# Patient Record
Sex: FEMALE | Race: WHITE | URBAN - METROPOLITAN AREA
[De-identification: names, ages, dates, MRNs, and addresses within clinical notes are randomized per-mention and may not be internally consistent; named-entity substitution may affect disease eponyms.]

---

## 2023-10-22 PROBLEM — R42 DIZZINESS: Status: ACTIVE | Noted: 2023-10-22

## 2023-10-22 PROBLEM — H90.3 ASNHL (ASYMMETRICAL SENSORINEURAL HEARING LOSS): Status: ACTIVE | Noted: 2023-10-22

## 2023-10-24 ENCOUNTER — APPOINTMENT (OUTPATIENT)
Dept: OTOLARYNGOLOGY | Facility: CLINIC | Age: 58
End: 2023-10-24
Payer: COMMERCIAL

## 2023-10-31 ENCOUNTER — APPOINTMENT (OUTPATIENT)
Dept: OTOLARYNGOLOGY | Facility: CLINIC | Age: 58
End: 2023-10-31
Payer: COMMERCIAL

## 2023-11-21 ENCOUNTER — CLINICAL SUPPORT (OUTPATIENT)
Dept: AUDIOLOGY | Facility: CLINIC | Age: 58
End: 2023-11-21
Payer: COMMERCIAL

## 2023-11-21 DIAGNOSIS — H81.11 BPPV (BENIGN PAROXYSMAL POSITIONAL VERTIGO), RIGHT: ICD-10-CM

## 2023-11-21 DIAGNOSIS — R42 DIZZINESS: Primary | ICD-10-CM

## 2023-11-21 DIAGNOSIS — H90.3 ASNHL (ASYMMETRICAL SENSORINEURAL HEARING LOSS): ICD-10-CM

## 2023-11-21 PROCEDURE — 92537 CALORIC VSTBLR TEST W/REC: CPT

## 2023-11-21 PROCEDURE — 92700 UNLISTED ORL SERVICE/PX: CPT

## 2023-11-21 PROCEDURE — 92540 BASIC VESTIBULAR EVALUATION: CPT

## 2023-11-21 NOTE — PROGRESS NOTES
BALANCE FUNCTION TEST (BFT)    Name:  Sue Holland  :  1965  Age:  58 y.o.  Date of Evaluation:  2023    Time: 10:30-12:00    IMPRESSIONS     Positive evidence for active Benign Paroxysmal Positional Vertigo (BPPV) given the following: Up-beating torsional nystagmus to the right in the head right position with reversal, indicative of right posterior canal BPPV. Canalith repositioning treatment (i.e. Nehemias-Hallpike Maneuver) was performed prior to leaving today's appointment. Patient tolerated treatment well.    Suspected additional peripheral vestibular involvement given the following: Borderline asymmetric caloric irrigations (weaker to the right), evidence of low total SPV for right ear caloric testing, and evidence of overt saccades for the right lateral and posterior canals during vHIT testing. Note that current clinical balance tests cannot distinguish between lesions of the labyrinth, VIIIth nerve, or root entry zone of the brainstem vestibular nuclei, thus the phrase peripheral refers to all of the structures. The vestibular system appears to be compensated physiologically and uncompensated functionally.    There were no indications of central vestibular system pathway involvement.     RECOMMENDATIONS     Consider vestibular physical therapy to address active right posterior canal BPPV and uncompensated unilateral vestibulopathy. Emphasis on gaze stabilization exercises for VOR gain, habituation exercises to triggers, and fall risk prevention.  Consider re-evaluation as medically indicated.  Maintain a healthy lifestyle to help body function overall.  Continue monitoring per ENT/PCP preference.    HISTORY     Patient was seen for Balance Function Testing (BFT) due to a history of dizziness/imbalance. Vestibular case history collected via patient-clinician interview, patient chart review, and patient questionnaires.    Patient reports bilateral sudden sensorineural hearing loss (SSNHL) about 20  years ago with hearing reportedly recovering following steroid injections. She had mild clumsiness at that time and underwent a VNG/ENG examination for suspected Meniere's disease (results not available for review today). A second occurrence of bilateral SSNHL happened about 2 years later in which her left-sided hearing did not recover. She has associated left-sided tinnitus described as debilitating. In April of this year, she developed right-sided SSNHL. She was treated with oral steroids and intratympanic steroid injections in both ears with significant hearing improvement, though she now has some nonpulsatile tinnitus in her right ear.     Patient also reports history of intermittent imbalance beginning several years ago, as well as episodic dizziness described as room spinning (which lasts about 10-15 seconds). Symptoms are provoked by lying down. Episodes occur nightly and last several seconds before subsiding. Patient reports she occasionally has more severe episodes that last 30 minutes to 24 hours, but she has not had a severe episodes in several years. Most recent episode occurred last night. She has been treated for Benign Paroxysmal Positional Vertigo (BPPV) in the past.    This patient has had a total of 5 falls due to their symptoms, she notes usually while on vacation. No major injury as a result of falls. Denied any recent medication changes. Patient reported complying with pre-test instructions. No significant neck pain or restriction which would contraindicate portions of testing today. Most recent audiologic evaluation performed on 9/25/2023 revealed normal hearing sloping to a mild hearing loss in the right ear and a severe sensorineural hearing loss in the left ear. Tympanometry revealed normal eardrum mobility and canal volume, bilaterally.    EVALUATION     See VNG & vHIT Raw Data    TEST RESULTS     BEDSIDE ASSESSMENT TEST  The bedside assessment is an optional portion of the test battery to  further assist in differential diagnosis and screen for eye abnormalities which may affect testing.    Otoscopy: unremarkable.  Extra-Ocular Range of Motion: normal. Of note, leftward and rightward endpoint nystagmus present. Extra-Ocular Range of Motion is performed to evaluate any eye abnormalities prior to testing.  Cover/Uncover: normal. Cover/Uncover test is performed to evaluate for skewed deviation of the eyes prior to testing.  Cervical Neck Exam: normal. Cervical Neck is performed to evaluate any restrictions or pain with neck movement prior to testing.  Vertebral Artery Screen: normal. Vertebral Artery Screen is performed to evaluate for vertebrobasilar insufficiency prior to testing.   Ocular Counter-Roll: normal. Ocular Counter-Roll is performed to evaluate ocular tilt reaction and assess otolith organ function prior to testing.  VOR Cancellation: normal. VOR Cancellation is performed to evaluate fixation suppression prior to testing.      VIDEONYSTAGMOGRAPHY (VNG) TEST  VNG provides objective indications of peripheral and central vestibulo-ocular pathway involvement. Ocular motor testing to visually guided targets is conducted using a dual channel video-recording technique for the recording of eye movement in the horizontal and vertical planes. Air caloric testing is performed at 48 degrees C and 24 degrees C.    Spontaneous Nystagmus test was absent. Spontaneous nystagmus testing may help with the identification of an acute or uncompensated peripheral vestibular lesion.   Gaze Nystagmus test was normal. Gaze nystagmus testing is to evaluate for nystagmus that is evoked by holding eye gaze in any particular direction. True gaze nystagmus is amplified when vision is denied.   Random Saccades test was normal. Random saccade testing is to evaluate patient's ability to make fast random eye movements along a horizontal moving target.   Smooth Pursuit/Tracking test was normal. Smooth pursuit/tracking testing  is to evaluate the ability to move eyes with a single smoothly moving target.   Optokinetic nystagmus testing was normal at 40 d/s. Of note, abnormally low gain observed in the right eye only for leftward eye movements, however, confirmed conjugate eye movement during bedside examination. Not indicative of true abnormal response therefore not clinically significant. This full-field OPK test is to evaluate the ability of central nervous system to stabilize vision during sustained head movement after the VOR system loses effectiveness.   Nehemias-Hallpike testing was abnormal given the presence of up-beating torsional nystagmus to the right in the head right position. Patient reported dizziness during testing and nystagmus reduced with fixation. Reversal of nystagmus upon sitting was observed. Results are indicative of right posterior canal BPPV. [place]-Hallpike testing is to provide a diagnosis of Benign Paroxysmal Positional Vertigo (BPPV) of the vertical semicircular canals on the side which is most affected.  Roll testing was normal. Roll testing is to provide a diagnosis of Benign Paroxysmal Positional Vertigo (BPPV) of the horizontal semicircular canals on the side which is most affected.  Positional testing was normal. Positional testing is to evaluate patient's ability to hold a steady gaze while in different positions.  Bithermal caloric testing was borderline abnormal. Unilateral weakness of 23% to the right which is borderline abnormal and a directional preponderance of 8% to the left which is normal. Caloric testing is to evaluate for peripheral vestibular lesion.      VIDEO HEAD IMPULSE TEST (vHIT)  The vHIT procedure provides objective assessment of the high frequency vestibulo-ocular reflex (VOR) for each semicircular canal. Rapid, random horizontal and vertical thrusts are applied to the patient's head to provoke the VOR.    Right Lateral gain was normal. There was evidence of overt saccades.   Left Lateral  gain was normal. There was evidence of overt saccades.     Right Anterior gain was normal. There was no evidence of overt or covert saccades.  Left Posterior gain was normal. There was no evidence of overt or covert saccades.    Left Anterior gain was normal. There was no evidence of overt or covert saccades.  Right Posterior gain was normal. There was evidence of overt saccades.       Testing and interpretation of results completed by NAOMIE Souza, CCC-A. It was my pleasure to evaluate this patient.     Additional Attendees: Joann De Jesus, Doctor of Audiology Extern       Naomie Souza CCC-A [facility]  Licensed Audiologist

## 2024-01-30 ENCOUNTER — TELEMEDICINE (OUTPATIENT)
Dept: OTOLARYNGOLOGY | Facility: CLINIC | Age: 59
End: 2024-01-30
Payer: COMMERCIAL

## 2024-01-30 DIAGNOSIS — H90.3 BILATERAL SENSORINEURAL HEARING LOSS: ICD-10-CM

## 2024-01-30 DIAGNOSIS — Z01.818 PRE-OP TESTING: ICD-10-CM

## 2024-01-30 DIAGNOSIS — H90.3 ASNHL (ASYMMETRICAL SENSORINEURAL HEARING LOSS): ICD-10-CM

## 2024-01-30 PROCEDURE — 99212 OFFICE O/P EST SF 10 MIN: CPT | Performed by: STUDENT IN AN ORGANIZED HEALTH CARE EDUCATION/TRAINING PROGRAM

## 2024-01-31 ENCOUNTER — TELEPHONE (OUTPATIENT)
Dept: OTOLARYNGOLOGY | Facility: CLINIC | Age: 59
End: 2024-01-31
Payer: COMMERCIAL

## 2024-01-31 DIAGNOSIS — Z01.818 PRE-OP TESTING: ICD-10-CM

## 2024-01-31 DIAGNOSIS — H90.3 ASNHL (ASYMMETRICAL SENSORINEURAL HEARING LOSS): ICD-10-CM

## 2024-01-31 RX ORDER — PNEUMOCOCCAL 20-VALENT CONJUGATE VACCINE 2.2; 2.2; 2.2; 2.2; 2.2; 2.2; 2.2; 2.2; 2.2; 2.2; 2.2; 2.2; 2.2; 2.2; 2.2; 2.2; 4.4; 2.2; 2.2; 2.2 UG/.5ML; UG/.5ML; UG/.5ML; UG/.5ML; UG/.5ML; UG/.5ML; UG/.5ML; UG/.5ML; UG/.5ML; UG/.5ML; UG/.5ML; UG/.5ML; UG/.5ML; UG/.5ML; UG/.5ML; UG/.5ML; UG/.5ML; UG/.5ML; UG/.5ML; UG/.5ML
0.5 INJECTION, SUSPENSION INTRAMUSCULAR ONCE
Qty: 0.5 ML | Refills: 0 | Status: SHIPPED | OUTPATIENT
Start: 2024-01-31 | End: 2024-01-31

## 2024-01-31 NOTE — TELEPHONE ENCOUNTER
Contacted patient at [phone] to discuss surgery dates which include, [place]- 2/7, 3/6, 3/20 & 3/22 at [facility]. Left office contact information for the patient to confirm or deny whether any of those dates work for her.

## 2024-01-31 NOTE — PROGRESS NOTES
This visit was performed over the phone and for this reason, physical exam was deferred.    Evaluation for cochlear implant      History of present illness from 9/26/2023:  Ms. Holland is a 58-year-old woman who presents with profound left-sided hearing loss. She reports that she had sudden loss of hearing 20 years ago, in both of her ears. She was treated with steroids at the time, which resulted in recovery of her hearing. However, over the course of 2 years, she had gradual loss of hearing in her left ear, and for the past 18 years has had no serviceable hearing in left ear. She had been stable in her right ear over that entire course of time. She does report that she intermittently will have some dizziness, which sometimes is consistent with benign paroxysmal positional vertigo and happens when she is lying down and turning to the side. These episodes of vertigo usually lasts a few seconds. She is familiar with BPPV and is not concerned with this. She does occasionally have more severe episodes that last 30 minutes to 24 hours, but she has not had 1 of those episodes in many years. She does occasionally feel off balance generally. This typically accompanies having tinnitus and fullness in 1 or both of her ears. She reports that the tinnitus in her left ear is very bothersome. Additionally, in April of this year, she developed sudden hearing loss in the right ear. She was treated with oral steroids and intratympanic steroid injections in both ears. She reports that she had significant improvement in the right ear, though she now has some nonpulsatile tinnitus in her right ear. She has been stable off steroids in the right ear.     Interval History: She underwent VNG, which was concerning for right BPPV, and had an epley maneuver.  She has been doing better from a balance perspective.  She is interested in cochlear implant on the left.    The patient's current medications, active allergies and list of medical  problems were reviewed in the EHR and confirmed electronically.    Diagnostic testing:  I reviewed the audiogram from 9/25/2023, which demonstrates mild high-frequency sensorineural hearing loss only at 8000 Hz. It also demonstrates profound left sensorineural hearing loss. There are type a tympanograms bilaterally. Word recognition score is 96% on the right and 0% on the left.     I reviewed the results of the MRI IAC, both the images and the report. There was no evidence of retrocochlear pathology.     I also reviewed the results of the cochlear implant evaluation below:  R =   SRT = 5 dB HL  CNC words in quiet at 50 dB %  AzBio in noise at 50 dB HL with 40 dB noise = 97%  Pure tone responses from 250 Hz - 6000 Hz were in the normal hearing range.      L = Left Phonak P UP BTE hearing aid (opposite ear plugged and masked)  SRT = 65 dB HL  CNC words in quiet at 50 dB HL = 0%  AzBio in noise at 50 dB HL with 40 dB noise = 0%     I reviewed the VNG, which demonstrated right BPPV and possibly a slight weakness on the right, but mostly symmetric balance function.    Impression:  Left profound sensorineural hearing loss  Intermittent dizziness and loss of hearing     Recommendation:  I discussed options for her hearing rehabilitation on the left side. I discussed CROS hearing aids, bone-conduction devices, and cochlear implantation. I discussed that cochlear implantation may be beneficial in terms of localization, hearing and noise, and may reduce some of the tinnitus in her left ear. I discussed the risks of cochlear implantation including, but not limited to, poor performance, bleeding, pain, infection, loss of natural hearing, change in taste, facial weakness, dizziness, cerebrospinal fluid leak, tympanic membrane perforation, implant infection, implant extrusion, implant malfunction, meningitis. I also discussed the importance of Prevnar vaccination. She would like to pursue left cochlear implantation. I would  recommend cochlear brand 632 with 622 backup or advanced bionic slim J with a slim J backup or MED-EL flex soft with flex 28 backup.  We will plan for left cochlear implantation.    Jas Wetzel MD

## 2024-02-01 ENCOUNTER — TELEPHONE (OUTPATIENT)
Dept: OTOLARYNGOLOGY | Facility: CLINIC | Age: 59
End: 2024-02-01
Payer: COMMERCIAL

## 2024-02-01 NOTE — TELEPHONE ENCOUNTER
Left voice message at [phone] to discuss travel plans and recovery time needed before flying. Per Dr. Wetzel, the patient must wait 2 weeks post surgery before flying. Will await callback to discuss whether the recovery time needed prior to flying causes a conflict with travel plans.

## 2024-02-02 ENCOUNTER — TELEPHONE (OUTPATIENT)
Dept: OTOLARYNGOLOGY | Facility: CLINIC | Age: 59
End: 2024-02-02
Payer: COMMERCIAL

## 2024-02-02 NOTE — TELEPHONE ENCOUNTER
Spoke with patient to confirm 4/3 cochlear implant surgery. The patient has an appointment with Audiology to select a device and plans to call the CI coordinator to set up all of post op appointments.

## 2024-02-06 ENCOUNTER — TELEPHONE (OUTPATIENT)
Dept: OTOLARYNGOLOGY | Facility: CLINIC | Age: 59
End: 2024-02-06
Payer: COMMERCIAL

## 2024-02-06 NOTE — TELEPHONE ENCOUNTER
Spoke with patient regarding request to change surgery date from 4/3 to 4/17 due to travel plans. Patient agreeable to 4/17 surgery date at [facility].

## 2024-02-13 ENCOUNTER — CLINICAL SUPPORT (OUTPATIENT)
Dept: AUDIOLOGY | Facility: CLINIC | Age: 59
End: 2024-02-13
Payer: COMMERCIAL

## 2024-02-13 DIAGNOSIS — H90.3 ASNHL (ASYMMETRICAL SENSORINEURAL HEARING LOSS): ICD-10-CM

## 2024-02-13 PROCEDURE — 92626 EVAL AUD FUNCJ 1ST HOUR: CPT

## 2024-02-14 NOTE — PROGRESS NOTES
COCHLEAR IMPLANT EVALUATION: ADULT (candidate)    Name:  Sue Holland  :  1965  Age:  58 y.o.  Date of Evaluation:  2024    HISTORY  Sue Holland, age 58 years, arrives on 2024 for the device selection of a left cochlear implant after an evaluation on 2023 determined her to be a candidate    RECALL  She reports her first bilateral sudden sensorineural hearing loss (SSNHL) occurred about 20 years ago. It was treated with steroid injections that improved her hearing. About 2 years later she reports a second SSNHL that was treated; her left hearing never recovered. She reports since that time she experiences bilateral ear fullness/pressure, fluctuating hearing loss, and vertigo. She has left debilitating tinnitus with a recent start of right tinnitus. She has never worn hearing aids and reports at the first episode of SSNHL a VNG/ENG was completed for Meniere's disease.     AUDIOLOGIC EVALUATION COMPLETED ON 2023:    OTOSCOPY  Otoscopic inspection revealed clear canals and visualization of the eardrum bilaterally.     IMMITTANCE   Normal tympanograms were obtained bilaterally, consistent with a normal moving eardrums and the likely absence of fluid.     Ipsilateral acoustic reflexes were tested and present at 500Hz, 1000Hz, 2000Hz, and 4000Hz in the right ear and absent in the left ear.     AUDIOMETRIC TESTING  Pure tone audiometry conducted via insert headphones from 250 Hz - 8000 Hz was consistent with:  Right ear: normal hearing sloping to a mild hearing loss at 8000 Hz  Left: severe sensorineural hearing loss.      SPEECH TESTING  Speech recognition was in agreement with pure tone averages bilaterally ( Right: 10 dB HL, Left: 90 dB HL)     Word understanding was excellent in the right ear (100%) and poor(0%) in the left ear using recorded ordered by difficulty NU6 word list      COCHLEAR IMPLANT EVALUATION FROM 2023  Otoscopy indicated clear ear canals and visible tympanic  membranes, bilaterally.     All testing was completed in the soundfield at 0 degrees azimuth. Pure tone testing was obtained using frequency modulating (FM) stimuli, and SRT was obtained using monitored live voice (MLV). Word and sentence recognition testing was performed with recorded material at 50 dB HL. 50 dB HL = 60 dB A     R = Unaided  SRT = 5 dB HL  CNC words in quiet at 50 dB %  AzBio in noise at 50 dB HL with 40 dB noise = 97%  Pure tone responses from 250 Hz - 6000 Hz were in the normal hearing range.      L = Left Phonak P UP BTE hearing aid (opposite ear plugged and masked)  SRT = 65 dB HL  CNC words in quiet at 50 dB HL = 0%  AzBio in noise at 50 dB HL with 40 dB noise = 0%  Pure tone responses from 250 Hz - 6000 Hz were in the moderate to severe hearing loss range.     QUALITY OF LIFE (QOL) SURVEY: 2/13/2024    QOL 35:                    Raw Score                         Converted Score  Communication               26                                         41.07  Emotional                        17                                          59.22  Entertainment                 23                                          79.42  Environment                   19                                           61.22  Listening Effort                 17                                          51.83       Social                               23                                           82.1  Global                              34                                          51.99     ORDER  Brown [place] 2  Extra home   Portable    Mini Nba    RESULTS  Due to debilitating tinnitus, the patient has expressed interest in a cochlear implant. An evaluation of the right ear completed on 9/25/2023 revealed 97% performance on an open-set sentence recognition test. An evaluation of the left ear with appropriately fit amplification revealed 0% performance on an open-set sentence recognition test. The  audiologic findings demonstrate that Sue has partial residual hearing for tonal stimuli and speech detection with hearing aid, demonstrating that the auditory cranial nerve can be stimulated. Testing shows poor sentence and word understanding, even with appropriately programmed hearing aid indicating a severe to profound functional impairment in the left ear.      On this basis, unaided and aided testing indicates Sue is an audiologic candidate for a cochlear implant in the left ear.      Today, we completed a device selection to include a brown Kanso 2, extra home , portable  and mini yuriy     Time spent with patient: 1 hour    Completed by:  Naomie Feng, CCC-A, [facility]  Licensed Audiologist

## 2024-03-25 ASSESSMENT — ENCOUNTER SYMPTOMS
WHEEZING: 1
COUGH: 1

## 2024-03-25 NOTE — CPM/PAT H&P
CPM/PAT Evaluation       Name: Sue Holland (Sue Holland)  /Age: 1965/58 y.o.     TELEMEDICINE ENCOUNTER  Patient was contacted by telephone for preadmission testing perioperative risk assessment prior to surgery.    CHIEF COMPLAINT  Bilateral sensorineural hearing loss    HPI  Sue Holland is a 58-year-old female with bilateral sensorineural hearing loss.  She states that 20 years ago she experienced sudden hearing loss in both ears, and was treated with steroids, and recovered her hearing.  Over the past 2 years she has had gradual hearing loss in her left ear, and gradual right hearing loss since 2023..  She also has tinnitus in her left ear.  With the sudden right hearing loss 1 year ago she was treated with oral steroids and intratympanic steroid injections bilaterally.  She reports significant improvement in the right ear since that time.  She is interested in having a cochlear implant for her continued left hearing loss, and has been found to be a candidate for this surgery.  She is scheduled for insertion of left cochlear implant on 2024.      ACTIVE PROBLEMS  Patient Active Problem List   Diagnosis    ASNHL (asymmetrical sensorineural hearing loss)    Dizziness     PAST MEDICAL HISTORY  Past Medical History:   Diagnosis Date    PONV (postoperative nausea and vomiting)      SURGICAL HISTORY  Past Surgical History:   Procedure Laterality Date     SECTION, LOW TRANSVERSE      HYSTERECTOMY      Hysterectomy BSO     ANESTHESIA HISTORY  Reports history of PONV.  Denies other problems with anesthesia in the past such as prolonged sedation, awareness, dental damage, aspiration, cardiac arrest, difficult intubation, or unexpected hospital admissions. Denies family history of malignant hyperthermia, or pseudocholinesterase deficiency.      SOCIAL HISTORY  Never smoker; denies alcohol, medical marijuana, recreational drug use.  Patient states in good weather she will go for walks  several times a week for exercise.  She states she is able to do moderate ADLs such as heavy housework, light yard work.  She denies chest pain, RUSH.  METS 4    FAMILY HISTORY  No family history on file.    ALLERGIES  No Known Allergies    MEDICATIONS  No current facility-administered medications for this encounter.  No current outpatient medications on file.    Review of Systems   HENT:  Positive for congestion and hearing loss.    Respiratory:  Positive for cough and wheezing.         Patient reports upper respiratory tract infection for the past week.  She was prescribed a 10-day course of amoxicillin on 03/21/2024.  Patient denies any fevers, chills.  Reports improvement with wheezing and cough.  Occasional wheezing that is able to be cleared with coughing.   All other systems reviewed and are negative.    PHYSICAL EXAM  Deferred    AIRWAY EXAM  Deferred    VITALS  No vitals taken for telemedicine visit  Height: 4 feet 11 inches; weight: 145 pounds; BMI: 29.29    LABS/ IMAGING  Lab orders for CBC, BMP; EKG placed by Dr. Wetzel.      ASSESSMENT/PLAN  Bilateral sensorineural hearing loss  Insertion of left cochlear implant      This note was created in part upon personal review of patient's medical records.  Speech recognition transcription software was used in the creation of this note. Despite proofreading, several typographical errors might be present that might affect the meaning of the content.

## 2024-03-25 NOTE — PREPROCEDURE INSTRUCTIONS
Pre-Op Instructions & Checklist  Your surgery has been scheduled at [facility] at [address] [place], [address], [address], in the [facility]. Parking is to the left of the main entrance.  You will be contacted about the time of your surgery the day before your surgery. If you are unable to answer the phone, a detailed voicemail message will be left. Make sure that your voicemail box is not full so a message can be left. If you have not received a call by 3:00 pm you may call [phone] between the hours of 3:00 and 4:00 pm. Please be available by phone the night before/day of surgery in case there is a change in the schedule which may require you to arrive earlier/later.  14 DAYS BEFORE SURGERY STOP TAKING WEIGHT LOSS MEDICATIONS     7 DAYS BEFORE SURGERY STOP THESE MEDICATIONS:  Multiple Vitamins containing Vitamin E  Herbal supplements, Fish Oil, garlic pills, turmeric, CoQ enzyme  Stop taking aspirin, and aspirin-containing products as well as NSAID's such as Advil, Motrin, Aleve, Ibuprofen. Tylenol is okay to take for pain relief.   If you are currently taking Coumadin/Warfarin, we will have to coordinate that with your PCP &/or the Anticoagulation Clinic.    THE DAY BEFORE SURGERY:  *Do not eat any food after midnight the night before surgery.   *You are permitted to have clear liquids such as water, apple juice, plain tea or coffee (no milk or creamer), clear electrolyte-replenishing drinks such as Pedialyte, Gatorade, or Powerade (not yogurt or pulp-containing smoothies or juices such as orange juice) up to 2 hours before your surgery.  DAY OF SURGERY, TAKE THESE MEDICATIONS with a small sip of water (if it is not listed, do not take it):    There are no medications for you to take the morning of surgery.      ON THE MORNING OF SURGERY:  *Shower either the night before your surgery or the morning of your surgery  *Do not use moisturizers, creams, lotions or perfume, or  make-up.  *Wear comfortable, loose fitting clothing.   *All jewelry and valuables should be left at home.  *Prosthetic devices such as contact lenses, hearing aids, dentures, eyelash extensions, hairpins and body piercing must be removed before surgery. Bring containers for eyeglasses/contacts, dentures, or hearing aids with you.  Diabetics: Please check fasting blood sugars upon waking up.  If fasting blood sugars are<80ml/dl, please drink 3 ounces of apple juice no later than 2 hours prior to surgery.    BRING WITH YOU:  *Photo ID and insurance card  *Current list of medicines and allergies  *Pacemaker/Defibrillator/Heart stent cards  *Copy of your complete Advanced Directive/DHPOA-if applicable    SMOKING:  *Quitting smoking can make a huge difference to your health and recovery from surgery.    *If you need help with quitting, call [phone]-NOW.  Alcohol:  *No alcoholic beverages for 48 hours before surgery.    AFTER OUTPATIENT SURGERY:  *A responsible adult MUST accompany you at the time of discharge and stay with you for 24 hours after your surgery.  *You may NOT drive yourself home after surgery.  *You may use a taxi or ride sharing service (Lyft, Uber) to return home ONLY if you are accompanied by a friend or family member.  *Instructions for resuming your medications will be provided by your surgeon.    CONTACT SURGEON'S OFFICE IF YOU DEVELOP:  * Fever =/> 100.4 F   * New respiratory symptoms (e.g. cough, shortness of breath, respiratory distress, sore throat)  * Recent loss of taste or smell  *Flu like symptoms such as headache, fatigue or gastrointestinal symptoms  * If you develop any open sores, shingles, burning or painful urination   AND/OR:  * You no longer wish to have the surgery.  * Any other personal circumstances change that may lead to the need to cancel or defer this surgery.  *You were admitted to any hospital within one week of your planned procedure.      If you have any questions  regarding these preoperative instructions you may call [phone]. If you have questions regarding you surgical procedure, or post-operative care/recovery please call your surgeon's office.

## 2024-04-04 ENCOUNTER — HOSPITAL ENCOUNTER (OUTPATIENT)
Dept: CARDIOLOGY | Facility: HOSPITAL | Age: 59
Discharge: HOME | End: 2024-04-04
Payer: COMMERCIAL

## 2024-04-04 ENCOUNTER — LAB (OUTPATIENT)
Dept: LAB | Facility: LAB | Age: 59
End: 2024-04-04
Payer: COMMERCIAL

## 2024-04-04 DIAGNOSIS — Z01.818 PRE-OP TESTING: ICD-10-CM

## 2024-04-04 LAB
ALBUMIN SERPL BCP-MCNC: 4 G/DL (ref 3.4–5)
ALP SERPL-CCNC: 57 U/L (ref 33–136)
ALT SERPL W P-5'-P-CCNC: 9 U/L (ref 7–45)
ANION GAP SERPL CALC-SCNC: 10 MMOL/L (ref 10–20)
AST SERPL W P-5'-P-CCNC: 13 U/L (ref 9–39)
BILIRUB SERPL-MCNC: 0.4 MG/DL (ref 0–1.2)
BUN SERPL-MCNC: 11 MG/DL (ref 6–23)
CALCIUM SERPL-MCNC: 9.1 MG/DL (ref 8.6–10.3)
CHLORIDE SERPL-SCNC: 106 MMOL/L (ref 98–107)
CO2 SERPL-SCNC: 29 MMOL/L (ref 21–32)
CREAT SERPL-MCNC: 0.66 MG/DL (ref 0.5–1.05)
EGFRCR SERPLBLD CKD-EPI 2021: >90 ML/MIN/1.73M*2
ERYTHROCYTE [DISTWIDTH] IN BLOOD BY AUTOMATED COUNT: 14 % (ref 11.5–14.5)
GLUCOSE SERPL-MCNC: 60 MG/DL (ref 74–99)
HCT VFR BLD AUTO: 44 % (ref 36–46)
HGB BLD-MCNC: 13.9 G/DL (ref 12–16)
MCH RBC QN AUTO: 27.3 PG (ref 26–34)
MCHC RBC AUTO-ENTMCNC: 31.6 G/DL (ref 32–36)
MCV RBC AUTO: 86 FL (ref 80–100)
NRBC BLD-RTO: 0 /100 WBCS (ref 0–0)
PLATELET # BLD AUTO: 488 X10*3/UL (ref 150–450)
POTASSIUM SERPL-SCNC: 4.6 MMOL/L (ref 3.5–5.3)
PROT SERPL-MCNC: 6.6 G/DL (ref 6.4–8.2)
RBC # BLD AUTO: 5.1 X10*6/UL (ref 4–5.2)
SODIUM SERPL-SCNC: 140 MMOL/L (ref 136–145)
WBC # BLD AUTO: 9.1 X10*3/UL (ref 4.4–11.3)

## 2024-04-04 PROCEDURE — 85027 COMPLETE CBC AUTOMATED: CPT

## 2024-04-04 PROCEDURE — 36415 COLL VENOUS BLD VENIPUNCTURE: CPT

## 2024-04-04 PROCEDURE — 80053 COMPREHEN METABOLIC PANEL: CPT

## 2024-04-04 PROCEDURE — 93005 ELECTROCARDIOGRAM TRACING: CPT

## 2024-04-04 PROCEDURE — 93010 ELECTROCARDIOGRAM REPORT: CPT | Performed by: INTERNAL MEDICINE

## 2024-04-16 ENCOUNTER — ANESTHESIA EVENT (OUTPATIENT)
Dept: OPERATING ROOM | Facility: CLINIC | Age: 59
End: 2024-04-16
Payer: COMMERCIAL

## 2024-04-17 ENCOUNTER — HOSPITAL ENCOUNTER (OUTPATIENT)
Facility: CLINIC | Age: 59
Setting detail: OUTPATIENT SURGERY
Discharge: HOME | End: 2024-04-17
Attending: STUDENT IN AN ORGANIZED HEALTH CARE EDUCATION/TRAINING PROGRAM | Admitting: STUDENT IN AN ORGANIZED HEALTH CARE EDUCATION/TRAINING PROGRAM
Payer: COMMERCIAL

## 2024-04-17 ENCOUNTER — ANESTHESIA (OUTPATIENT)
Dept: OPERATING ROOM | Facility: CLINIC | Age: 59
End: 2024-04-17
Payer: COMMERCIAL

## 2024-04-17 VITALS
SYSTOLIC BLOOD PRESSURE: 128 MMHG | HEIGHT: 59 IN | WEIGHT: 158.73 LBS | RESPIRATION RATE: 18 BRPM | BODY MASS INDEX: 32 KG/M2 | OXYGEN SATURATION: 95 % | DIASTOLIC BLOOD PRESSURE: 77 MMHG | HEART RATE: 90 BPM | TEMPERATURE: 97.7 F

## 2024-04-17 DIAGNOSIS — H90.3 ASNHL (ASYMMETRICAL SENSORINEURAL HEARING LOSS): Primary | ICD-10-CM

## 2024-04-17 DIAGNOSIS — G89.18 ACUTE POSTOPERATIVE PAIN: ICD-10-CM

## 2024-04-17 PROCEDURE — 2500000001 HC RX 250 WO HCPCS SELF ADMINISTERED DRUGS (ALT 637 FOR MEDICARE OP): Performed by: ANESTHESIOLOGY

## 2024-04-17 PROCEDURE — A4217 STERILE WATER/SALINE, 500 ML: HCPCS | Performed by: STUDENT IN AN ORGANIZED HEALTH CARE EDUCATION/TRAINING PROGRAM

## 2024-04-17 PROCEDURE — 2500000004 HC RX 250 GENERAL PHARMACY W/ HCPCS (ALT 636 FOR OP/ED): Performed by: ANESTHESIOLOGY

## 2024-04-17 PROCEDURE — 7100000009 HC PHASE TWO TIME - INITIAL BASE CHARGE: Performed by: STUDENT IN AN ORGANIZED HEALTH CARE EDUCATION/TRAINING PROGRAM

## 2024-04-17 PROCEDURE — 7100000010 HC PHASE TWO TIME - EACH INCREMENTAL 1 MINUTE: Performed by: STUDENT IN AN ORGANIZED HEALTH CARE EDUCATION/TRAINING PROGRAM

## 2024-04-17 PROCEDURE — L8614 COCHLEAR DEVICE: HCPCS | Performed by: STUDENT IN AN ORGANIZED HEALTH CARE EDUCATION/TRAINING PROGRAM

## 2024-04-17 PROCEDURE — 2500000004 HC RX 250 GENERAL PHARMACY W/ HCPCS (ALT 636 FOR OP/ED): Performed by: STUDENT IN AN ORGANIZED HEALTH CARE EDUCATION/TRAINING PROGRAM

## 2024-04-17 PROCEDURE — 2500000005 HC RX 250 GENERAL PHARMACY W/O HCPCS: Performed by: NURSE ANESTHETIST, CERTIFIED REGISTERED

## 2024-04-17 PROCEDURE — 3700000002 HC GENERAL ANESTHESIA TIME - EACH INCREMENTAL 1 MINUTE: Performed by: STUDENT IN AN ORGANIZED HEALTH CARE EDUCATION/TRAINING PROGRAM

## 2024-04-17 PROCEDURE — 2500000004 HC RX 250 GENERAL PHARMACY W/ HCPCS (ALT 636 FOR OP/ED): Performed by: NURSE ANESTHETIST, CERTIFIED REGISTERED

## 2024-04-17 PROCEDURE — A69930 PR IMPLANT COCHLEAR DEVICE: Performed by: ANESTHESIOLOGY

## 2024-04-17 PROCEDURE — A69930 PR IMPLANT COCHLEAR DEVICE: Performed by: NURSE ANESTHETIST, CERTIFIED REGISTERED

## 2024-04-17 PROCEDURE — 2500000002 HC RX 250 W HCPCS SELF ADMINISTERED DRUGS (ALT 637 FOR MEDICARE OP, ALT 636 FOR OP/ED): Performed by: ANESTHESIOLOGY

## 2024-04-17 PROCEDURE — 2500000005 HC RX 250 GENERAL PHARMACY W/O HCPCS: Performed by: STUDENT IN AN ORGANIZED HEALTH CARE EDUCATION/TRAINING PROGRAM

## 2024-04-17 PROCEDURE — 69930 IMPLANT COCHLEAR DEVICE: CPT | Performed by: STUDENT IN AN ORGANIZED HEALTH CARE EDUCATION/TRAINING PROGRAM

## 2024-04-17 PROCEDURE — 2780000003 HC OR 278 NO HCPCS: Performed by: STUDENT IN AN ORGANIZED HEALTH CARE EDUCATION/TRAINING PROGRAM

## 2024-04-17 PROCEDURE — 2720000007 HC OR 272 NO HCPCS: Performed by: STUDENT IN AN ORGANIZED HEALTH CARE EDUCATION/TRAINING PROGRAM

## 2024-04-17 PROCEDURE — 3600000004 HC OR TIME - INITIAL BASE CHARGE - PROCEDURE LEVEL FOUR: Performed by: STUDENT IN AN ORGANIZED HEALTH CARE EDUCATION/TRAINING PROGRAM

## 2024-04-17 PROCEDURE — 3600000009 HC OR TIME - EACH INCREMENTAL 1 MINUTE - PROCEDURE LEVEL FOUR: Performed by: STUDENT IN AN ORGANIZED HEALTH CARE EDUCATION/TRAINING PROGRAM

## 2024-04-17 PROCEDURE — 3700000001 HC GENERAL ANESTHESIA TIME - INITIAL BASE CHARGE: Performed by: STUDENT IN AN ORGANIZED HEALTH CARE EDUCATION/TRAINING PROGRAM

## 2024-04-17 PROCEDURE — 7100000001 HC RECOVERY ROOM TIME - INITIAL BASE CHARGE: Performed by: STUDENT IN AN ORGANIZED HEALTH CARE EDUCATION/TRAINING PROGRAM

## 2024-04-17 PROCEDURE — 7100000002 HC RECOVERY ROOM TIME - EACH INCREMENTAL 1 MINUTE: Performed by: STUDENT IN AN ORGANIZED HEALTH CARE EDUCATION/TRAINING PROGRAM

## 2024-04-17 DEVICE — IMPLANTABLE DEVICE
Type: IMPLANTABLE DEVICE | Site: EAR | Status: FUNCTIONAL
Brand: COCHLEAR™ NUCLEUS® CI632 COCHLEAR IMPLANT WITH SLIM MODIOLAR ELECTRODE

## 2024-04-17 RX ORDER — LABETALOL HYDROCHLORIDE 5 MG/ML
5 INJECTION, SOLUTION INTRAVENOUS ONCE AS NEEDED
Status: DISCONTINUED | OUTPATIENT
Start: 2024-04-17 | End: 2024-04-17 | Stop reason: HOSPADM

## 2024-04-17 RX ORDER — LIDOCAINE IN NACL,ISO-OSMOT/PF 30 MG/3 ML
0.1 SYRINGE (ML) INJECTION ONCE
Status: DISCONTINUED | OUTPATIENT
Start: 2024-04-17 | End: 2024-04-17 | Stop reason: HOSPADM

## 2024-04-17 RX ORDER — FENTANYL CITRATE 50 UG/ML
INJECTION, SOLUTION INTRAMUSCULAR; INTRAVENOUS AS NEEDED
Status: DISCONTINUED | OUTPATIENT
Start: 2024-04-17 | End: 2024-04-17

## 2024-04-17 RX ORDER — ONDANSETRON HYDROCHLORIDE 2 MG/ML
4 INJECTION, SOLUTION INTRAVENOUS ONCE AS NEEDED
Status: DISCONTINUED | OUTPATIENT
Start: 2024-04-17 | End: 2024-04-17 | Stop reason: HOSPADM

## 2024-04-17 RX ORDER — TRAMADOL HYDROCHLORIDE 50 MG/1
50 TABLET ORAL EVERY 6 HOURS PRN
Qty: 20 TABLET | Refills: 0 | Status: SHIPPED | OUTPATIENT
Start: 2024-04-17 | End: 2024-04-22

## 2024-04-17 RX ORDER — DEXAMETHASONE SODIUM PHOSPHATE 100 MG/10ML
INJECTION INTRAMUSCULAR; INTRAVENOUS AS NEEDED
Status: DISCONTINUED | OUTPATIENT
Start: 2024-04-17 | End: 2024-04-17

## 2024-04-17 RX ORDER — ONDANSETRON 4 MG/1
4 TABLET, ORALLY DISINTEGRATING ORAL EVERY 8 HOURS PRN
Qty: 20 TABLET | Refills: 0 | Status: SHIPPED | OUTPATIENT
Start: 2024-04-17

## 2024-04-17 RX ORDER — PROPOFOL 10 MG/ML
INJECTION, EMULSION INTRAVENOUS AS NEEDED
Status: DISCONTINUED | OUTPATIENT
Start: 2024-04-17 | End: 2024-04-17

## 2024-04-17 RX ORDER — ACETAMINOPHEN 325 MG/1
650 TABLET ORAL EVERY 4 HOURS PRN
Status: DISCONTINUED | OUTPATIENT
Start: 2024-04-17 | End: 2024-04-17 | Stop reason: HOSPADM

## 2024-04-17 RX ORDER — MIDAZOLAM HYDROCHLORIDE 1 MG/ML
INJECTION, SOLUTION INTRAMUSCULAR; INTRAVENOUS AS NEEDED
Status: DISCONTINUED | OUTPATIENT
Start: 2024-04-17 | End: 2024-04-17

## 2024-04-17 RX ORDER — SODIUM CHLORIDE, SODIUM LACTATE, POTASSIUM CHLORIDE, CALCIUM CHLORIDE 600; 310; 30; 20 MG/100ML; MG/100ML; MG/100ML; MG/100ML
100 INJECTION, SOLUTION INTRAVENOUS CONTINUOUS
Status: DISCONTINUED | OUTPATIENT
Start: 2024-04-17 | End: 2024-04-17 | Stop reason: HOSPADM

## 2024-04-17 RX ORDER — APREPITANT 40 MG/1
40 CAPSULE ORAL ONCE
Status: COMPLETED | OUTPATIENT
Start: 2024-04-17 | End: 2024-04-17

## 2024-04-17 RX ORDER — ROCURONIUM BROMIDE 10 MG/ML
INJECTION, SOLUTION INTRAVENOUS AS NEEDED
Status: DISCONTINUED | OUTPATIENT
Start: 2024-04-17 | End: 2024-04-17

## 2024-04-17 RX ORDER — ALBUTEROL SULFATE 0.83 MG/ML
2.5 SOLUTION RESPIRATORY (INHALATION) ONCE AS NEEDED
Status: DISCONTINUED | OUTPATIENT
Start: 2024-04-17 | End: 2024-04-17 | Stop reason: HOSPADM

## 2024-04-17 RX ORDER — FENTANYL CITRATE 50 UG/ML
25 INJECTION, SOLUTION INTRAMUSCULAR; INTRAVENOUS EVERY 5 MIN PRN
Status: DISCONTINUED | OUTPATIENT
Start: 2024-04-17 | End: 2024-04-17 | Stop reason: HOSPADM

## 2024-04-17 RX ORDER — SODIUM CHLORIDE 0.9 G/100ML
IRRIGANT IRRIGATION AS NEEDED
Status: DISCONTINUED | OUTPATIENT
Start: 2024-04-17 | End: 2024-04-17 | Stop reason: HOSPADM

## 2024-04-17 RX ORDER — CEFAZOLIN 1 G/1
INJECTION, POWDER, FOR SOLUTION INTRAVENOUS AS NEEDED
Status: DISCONTINUED | OUTPATIENT
Start: 2024-04-17 | End: 2024-04-17

## 2024-04-17 RX ORDER — PHENYLEPHRINE HCL IN 0.9% NACL 0.4MG/10ML
SYRINGE (ML) INTRAVENOUS AS NEEDED
Status: DISCONTINUED | OUTPATIENT
Start: 2024-04-17 | End: 2024-04-17

## 2024-04-17 RX ORDER — SODIUM CHLORIDE, SODIUM LACTATE, POTASSIUM CHLORIDE, CALCIUM CHLORIDE 600; 310; 30; 20 MG/100ML; MG/100ML; MG/100ML; MG/100ML
INJECTION, SOLUTION INTRAVENOUS CONTINUOUS PRN
Status: DISCONTINUED | OUTPATIENT
Start: 2024-04-17 | End: 2024-04-17

## 2024-04-17 RX ORDER — GLYCOPYRROLATE 0.2 MG/ML
INJECTION INTRAMUSCULAR; INTRAVENOUS AS NEEDED
Status: DISCONTINUED | OUTPATIENT
Start: 2024-04-17 | End: 2024-04-17

## 2024-04-17 RX ORDER — AMOXICILLIN 250 MG
1 CAPSULE ORAL DAILY PRN
Qty: 30 TABLET | Refills: 0 | Status: SHIPPED | OUTPATIENT
Start: 2024-04-17

## 2024-04-17 RX ORDER — METOCLOPRAMIDE HYDROCHLORIDE 5 MG/ML
10 INJECTION INTRAMUSCULAR; INTRAVENOUS ONCE AS NEEDED
Status: DISCONTINUED | OUTPATIENT
Start: 2024-04-17 | End: 2024-04-17 | Stop reason: HOSPADM

## 2024-04-17 RX ORDER — CEPHALEXIN 500 MG/1
500 CAPSULE ORAL 3 TIMES DAILY
Qty: 15 CAPSULE | Refills: 0 | Status: SHIPPED | OUTPATIENT
Start: 2024-04-17 | End: 2024-04-22

## 2024-04-17 RX ORDER — ONDANSETRON HYDROCHLORIDE 2 MG/ML
INJECTION, SOLUTION INTRAVENOUS AS NEEDED
Status: DISCONTINUED | OUTPATIENT
Start: 2024-04-17 | End: 2024-04-17

## 2024-04-17 RX ORDER — LIDOCAINE HYDROCHLORIDE 20 MG/ML
INJECTION, SOLUTION INFILTRATION; PERINEURAL AS NEEDED
Status: DISCONTINUED | OUTPATIENT
Start: 2024-04-17 | End: 2024-04-17

## 2024-04-17 RX ORDER — FENTANYL CITRATE 50 UG/ML
50 INJECTION, SOLUTION INTRAMUSCULAR; INTRAVENOUS EVERY 5 MIN PRN
Status: DISCONTINUED | OUTPATIENT
Start: 2024-04-17 | End: 2024-04-17 | Stop reason: HOSPADM

## 2024-04-17 RX ORDER — LIDOCAINE HYDROCHLORIDE AND EPINEPHRINE 10; 10 MG/ML; UG/ML
INJECTION, SOLUTION INFILTRATION; PERINEURAL AS NEEDED
Status: DISCONTINUED | OUTPATIENT
Start: 2024-04-17 | End: 2024-04-17 | Stop reason: HOSPADM

## 2024-04-17 RX ORDER — OXYCODONE HYDROCHLORIDE 5 MG/1
5 TABLET ORAL EVERY 6 HOURS PRN
Status: DISCONTINUED | OUTPATIENT
Start: 2024-04-17 | End: 2024-04-17 | Stop reason: HOSPADM

## 2024-04-17 RX ADMIN — GLYCOPYRROLATE 0.2 MG: 0.2 INJECTION INTRAMUSCULAR; INTRAVENOUS at 09:06

## 2024-04-17 RX ADMIN — LIDOCAINE HYDROCHLORIDE 80 MG: 20 INJECTION, SOLUTION INFILTRATION; PERINEURAL at 07:37

## 2024-04-17 RX ADMIN — FENTANYL CITRATE 50 MCG: 50 INJECTION, SOLUTION INTRAMUSCULAR; INTRAVENOUS at 07:32

## 2024-04-17 RX ADMIN — HYDROMORPHONE HYDROCHLORIDE 0.2 MG: 0.2 INJECTION, SOLUTION INTRAMUSCULAR; INTRAVENOUS; SUBCUTANEOUS at 11:40

## 2024-04-17 RX ADMIN — MIDAZOLAM 2 MG: 1 INJECTION INTRAMUSCULAR; INTRAVENOUS at 07:32

## 2024-04-17 RX ADMIN — FENTANYL CITRATE 25 MCG: 50 INJECTION, SOLUTION INTRAMUSCULAR; INTRAVENOUS at 11:00

## 2024-04-17 RX ADMIN — CEFAZOLIN 2 G: 1 INJECTION, POWDER, FOR SOLUTION INTRAMUSCULAR; INTRAVENOUS at 07:42

## 2024-04-17 RX ADMIN — HYDROMORPHONE HYDROCHLORIDE 0.2 MG: 0.2 INJECTION, SOLUTION INTRAMUSCULAR; INTRAVENOUS; SUBCUTANEOUS at 11:35

## 2024-04-17 RX ADMIN — Medication 80 MCG: at 08:18

## 2024-04-17 RX ADMIN — APREPITANT 40 MG: 40 CAPSULE ORAL at 07:25

## 2024-04-17 RX ADMIN — REMIFENTANIL HYDROCHLORIDE 0.08 MCG/KG/MIN: 1 INJECTION, POWDER, LYOPHILIZED, FOR SOLUTION INTRAVENOUS at 07:58

## 2024-04-17 RX ADMIN — PROPOFOL 200 MG: 10 INJECTION, EMULSION INTRAVENOUS at 07:37

## 2024-04-17 RX ADMIN — Medication 80 MCG: at 09:03

## 2024-04-17 RX ADMIN — ONDANSETRON 4 MG: 2 INJECTION INTRAMUSCULAR; INTRAVENOUS at 09:51

## 2024-04-17 RX ADMIN — DEXAMETHASONE SODIUM PHOSPHATE 10 MG: 10 INJECTION INTRAMUSCULAR; INTRAVENOUS at 07:43

## 2024-04-17 RX ADMIN — ROCURONIUM BROMIDE 50 MG: 50 INJECTION INTRAVENOUS at 07:37

## 2024-04-17 RX ADMIN — OXYCODONE 5 MG: 5 TABLET ORAL at 11:50

## 2024-04-17 RX ADMIN — FENTANYL CITRATE 50 MCG: 50 INJECTION, SOLUTION INTRAMUSCULAR; INTRAVENOUS at 07:37

## 2024-04-17 RX ADMIN — SODIUM CHLORIDE, POTASSIUM CHLORIDE, SODIUM LACTATE AND CALCIUM CHLORIDE: 600; 310; 30; 20 INJECTION, SOLUTION INTRAVENOUS at 07:32

## 2024-04-17 RX ADMIN — SUGAMMADEX 200 MG: 100 INJECTION, SOLUTION INTRAVENOUS at 10:05

## 2024-04-17 RX ADMIN — PROMETHAZINE HYDROCHLORIDE 6.25 MG: 25 INJECTION INTRAMUSCULAR; INTRAVENOUS at 10:35

## 2024-04-17 RX ADMIN — Medication 120 MCG: at 08:23

## 2024-04-17 RX ADMIN — PROPOFOL 30 MG: 10 INJECTION, EMULSION INTRAVENOUS at 09:29

## 2024-04-17 RX ADMIN — Medication 80 MCG: at 08:49

## 2024-04-17 SDOH — HEALTH STABILITY: MENTAL HEALTH: CURRENT SMOKER: 0

## 2024-04-17 ASSESSMENT — PAIN SCALES - GENERAL
PAINLEVEL_OUTOF10: 6
PAINLEVEL_OUTOF10: 3
PAINLEVEL_OUTOF10: 0 - NO PAIN
PAINLEVEL_OUTOF10: 3
PAINLEVEL_OUTOF10: 0 - NO PAIN
PAINLEVEL_OUTOF10: 6
PAINLEVEL_OUTOF10: 0 - NO PAIN
PAINLEVEL_OUTOF10: 6
PAINLEVEL_OUTOF10: 6
PAINLEVEL_OUTOF10: 3
PAIN_LEVEL: 0
PAINLEVEL_OUTOF10: 6

## 2024-04-17 ASSESSMENT — PAIN DESCRIPTION - LOCATION: LOCATION: EAR

## 2024-04-17 ASSESSMENT — ENCOUNTER SYMPTOMS
SHORTNESS OF BREATH: 0
COUGH: 0
VOMITING: 0
FEVER: 0
CHILLS: 0
NAUSEA: 0
SORE THROAT: 0

## 2024-04-17 ASSESSMENT — PAIN DESCRIPTION - ORIENTATION: ORIENTATION: LEFT

## 2024-04-17 ASSESSMENT — COLUMBIA-SUICIDE SEVERITY RATING SCALE - C-SSRS
2. HAVE YOU ACTUALLY HAD ANY THOUGHTS OF KILLING YOURSELF?: NO
1. IN THE PAST MONTH, HAVE YOU WISHED YOU WERE DEAD OR WISHED YOU COULD GO TO SLEEP AND NOT WAKE UP?: NO
6. HAVE YOU EVER DONE ANYTHING, STARTED TO DO ANYTHING, OR PREPARED TO DO ANYTHING TO END YOUR LIFE?: NO

## 2024-04-17 NOTE — H&P
CHIEF COMPLAINT: Surgery    History of present illness from 9/26/2023:  Ms. Holland is a 58-year-old woman who presents with profound left-sided hearing loss. She reports that she had sudden loss of hearing 20 years ago, in both of her ears. She was treated with steroids at the time, which resulted in recovery of her hearing. However, over the course of 2 years, she had gradual loss of hearing in her left ear, and for the past 18 years has had no serviceable hearing in left ear. She had been stable in her right ear over that entire course of time. She does report that she intermittently will have some dizziness, which sometimes is consistent with benign paroxysmal positional vertigo and happens when she is lying down and turning to the side. These episodes of vertigo usually lasts a few seconds. She is familiar with BPPV and is not concerned with this. She does occasionally have more severe episodes that last 30 minutes to 24 hours, but she has not had 1 of those episodes in many years. She does occasionally feel off balance generally. This typically accompanies having tinnitus and fullness in 1 or both of her ears. She reports that the tinnitus in her left ear is very bothersome. Additionally, in April of this year, she developed sudden hearing loss in the right ear. She was treated with oral steroids and intratympanic steroid injections in both ears. She reports that she had significant improvement in the right ear, though she now has some nonpulsatile tinnitus in her right ear. She has been stable off steroids in the right ear.     Interval History from 1/30/2024: She underwent VNG, which was concerning for right BPPV, and had an epley maneuver.  She has been doing better from a balance perspective.  She is interested in cochlear implant on the left.    Interval History: No change in symptoms. She is here for left cochlear implantation.     PAST MEDICAL HISTORY:   Past Medical History:   Diagnosis Date    NABIL  "(postoperative nausea and vomiting)        PAST SURGICAL HISTORY:   Past Surgical History:   Procedure Laterality Date     SECTION, LOW TRANSVERSE      HYSTERECTOMY      Hysterectomy BSO       MEDICATIONS: No current facility-administered medications for this encounter.    ALLERGIES: No Known Allergies    SOCIAL HISTORY:   reports that she has never smoked. She has never used smokeless tobacco. She reports that she does not drink alcohol and does not use drugs.    FAMILY HISTORY: No significant relevant family history    REVIEW OF SYSTEMS  Review of Systems   Constitutional:  Negative for chills and fever.   HENT:  Positive for hearing loss. Negative for sore throat.    Respiratory:  Negative for cough and shortness of breath.    Cardiovascular:  Negative for chest pain.   Gastrointestinal:  Negative for nausea and vomiting.       PHYSICAL EXAM:    VITALS:   Vitals:    24 1131   Weight: 65.8 kg (145 lb)   Height: 1.499 m (4' 11\")        GENERAL: healthy, alert, well developed, well nourished, no distress, cooperative, appears chronologic age    Communicates with normal voice and without hearing aids.    HEAD: atraumatic, normocephalic, no lesions    EYES: normal, PERRLA and EOM's intact    EARS:   Right ear Normal external ear   Left ear: Normal external ear    NOSE: nose shows no deformity, asymmetry, or inflammation, nasal mucosa normal,     ORAL CAVITY/OROPHARYNX: negative findings: lips normal without lesions, buccal mucosa normal, gums healthy, teeth intact, non-carious, palate normal, tongue midline and normal, soft palate, uvula, and tonsils normal    NECK AND SALIVARY GLANDS: Neck is supple without masses or lymphadenopathy. Palpation of the parotid and submandibular gland reveals no masses or tenderness to palpation.    CRANIAL NERVES: intact,   Facial nerve exam  is House - Brackmann 1- Normal Function on the right and 1- Normal Function on the left.    CARDIOVASCULAR: No evidence of " peripheral edema    PULMONARY: normal lung excursion, no evidence of retractions    DATA REVIEWED:  Audiogram  I reviewed the audiogram from 9/25/2023, which demonstrates mild high-frequency sensorineural hearing loss only at 8000 Hz. It also demonstrates profound left sensorineural hearing loss. There are type a tympanograms bilaterally. Word recognition score is 96% on the right and 0% on the left.     I reviewed the results of the MRI IAC, both the images and the report. There was no evidence of retrocochlear pathology.     I also reviewed the results of the cochlear implant evaluation below:  R =   SRT = 5 dB HL  CNC words in quiet at 50 dB %  AzBio in noise at 50 dB HL with 40 dB noise = 97%  Pure tone responses from 250 Hz - 6000 Hz were in the normal hearing range.      L = Left Phonak P UP BTE hearing aid (opposite ear plugged and masked)  SRT = 65 dB HL  CNC words in quiet at 50 dB HL = 0%  AzBio in noise at 50 dB HL with 40 dB noise = 0%    Impression:  Left profound sensorineural hearing loss  Intermittent dizziness and loss of hearing     Recommendation:  I discussed options for her hearing rehabilitation on the left side. I discussed CROS hearing aids, bone-conduction devices, and cochlear implantation. I discussed that cochlear implantation may be beneficial in terms of localization, hearing and noise, and may reduce some of the tinnitus in her left ear. I discussed the risks of cochlear implantation including, but not limited to, poor performance, bleeding, pain, infection, loss of natural hearing, change in taste, facial weakness, dizziness, cerebrospinal fluid leak, tympanic membrane perforation, implant infection, implant extrusion, implant malfunction, meningitis. I also discussed the importance of Prevnar vaccination. She elected to proceed with cochlear implantation.    Jas Wetzel MD

## 2024-04-17 NOTE — ANESTHESIA PREPROCEDURE EVALUATION
Patient: Sue Holland    Procedure Information       Anesthesia Start Date/Time: 04/17/24 0723    Procedure: Insertion Cochlear Implant KANSO 2 (Left) - Nucleus 632/[id number] backup single processor    Location: [facility] [place] [place] [facility] / [facility] [place] [place] [facility] [place]    Surgeons: Jas Wetzel MD            Relevant Problems   Anesthesia (within normal limits)      HEENT   (+) ASNHL (asymmetrical sensorineural hearing loss)       Clinical information reviewed:   Tobacco  Allergies  Meds  Problems  Med Hx  Surg Hx  OB Status    Fam Hx  Soc Hx        NPO Detail:  NPO/Void Status  Date of Last Liquid: 04/16/24  Time of Last Liquid: 2200  Date of Last Solid: 04/16/24  Time of Last Solid: 2200         Physical Exam    Airway  Mallampati: I  TM distance: >3 FB  Neck ROM: full     Cardiovascular - normal exam  Rhythm: regular     Dental    Pulmonary - normal exam     Abdominal - normal exam         Anesthesia Plan    History of general anesthesia?: yes  History of complications of general anesthesia?: no    ASA 1     general     The patient is not a current smoker.    intravenous induction   Postoperative administration of opioids is intended.  Anesthetic plan and risks discussed with patient.  Use of blood products discussed with patient who.    Plan discussed with CRNA.

## 2024-04-17 NOTE — DISCHARGE INSTRUCTIONS
Most ear surgeries should have a 2-4 week postoperative appointment. Please be sure to call the doctor's office and make a follow-up appointment, if you don't already have it.  Dressing or Band-Aid can be removed the day after surgery.  Once removed, replace the cotton ball in the ear as needed.  Once the dressing is off, and if you have an incision behind your ear with stitches, clean the incision twice daily with soap and water and apply Vaseline or antibiotic ointment after cleaning. If you have paper strips or surgical glue over the incision, Do not apply anything behind the ear.  Bloody drainage from the ear is common.  Call the office if discharge from the ear last longer than 21 days or develops an odor or color.  You may shower the day after surgery, if you keep your head dry.  The hair may be shampooed 2 days following surgery.  You may get water on incision or in ear canal, but do not actively scrub the incision.  Bloody discharge from incision area may occur during the first 10 days following surgery.  If this persists or increases, please call the office.  A full sensation with popping sounds may be noticed during the healing process.  DO NOT BLOW YOUR NOSE FOR THREE WEEKS FOLLOWING SURGERY. If you sneeze, do so with your mouth open for three weeks following surgery. Do not use a straw to drink beverages for 3 weeks following surgery.  Do not use Q-Tips or put anything in the canal, until approved by your doctor.  Do not be concerned regarding your hearing for a period of six to eight weeks following surgery.  Your hearing will be evaluated at this time; until then, your hearing may sound muffled and your voice may echo in your ear during speech.  Minor swelling of the face on the same side of the surgery is not uncommon.  Small bruising near the eye or mouth is not uncommon from the facial nerve monitor.  Dizziness, ringing in the ear, and taste disturbance after surgery are common. Call if  severe.   You might notice pain when chewing, please use soft diet for 2 weeks if you experience this.  No lifting (more than 10 lbs) or straining until follow up.  You will be discharged on pain medications and possibly antibiotics.  You may resume your routine medications as directed, unless you have been instructed otherwise by the prescribing healthcare provider.  Should you experience any difficulty upon returning home, or if you simply have questions, please contact us.  As your surgeons, we are most familiar with your operation and postoperative procedures.  We are accessible by telephone 24 hours a day, 7 days a week.  Once we have assessed your situation, we will be prepared to make specific suggestions for your care.

## 2024-04-17 NOTE — PROGRESS NOTES
Patient Name Sue Holland  Date of birth 1965  Date 4/17/24  Surgeon Jas Wetzel MD  Audiologist  Naomie Mcdermott, CCC-A    Implanted Ear LEFT    [employer]  Implant   Serial Number [id number]      Electrode insertion achieved: Full     The following electrodes were noted to be outside the cochlea: NONE    Impedances:  Impedances were measured on electrodes 1-22 . An open circuit is noted on electrode 18.    aNRT/ NRI:  Positive neural response on electrodes 1 through 22 except electrode 18 due to open electrode    ESRT: ESRTs were obtained using a pulse width of 37 and a stimulation rate of 900 Hz.  Responses were obtained at electrodes 1 (235), 6 (230), 12 (235), 17 (240) and 22 (245)    ECoG: Intracochlear electrocochleography was not clinically indicated.           Equipment and Forms:   The patient's equipment backpack was provided to the family. They were counseled to bring the backpack to activation. Post-operative course was reviewed with the family. The patient is instructed to stop using their hearing aid on the newly implanted ear. They are encouraged to continue using their hearing aid on the non-implanted ear.     The family was provided with the internal implant guide, MRI compatibility booklet, and implant identification card specific to the patient's implant. The patient should place this in their wallet. The family was counseled that the internal implant was registered; indicating the start of the 10 year 's warranty. The external equipment, located in the backpack provided, will be registered at the activation; indicating the start of the 5 year 's warranty on the processor.     The activation follow up schedule was reviewed and a completed form with the appointments was provided. The auditory verbal therapy (AVT) form was provided to the family. The patient is guided to schedule AVT between the 2nd and 3rd activation by the Cochlear  Implant team.     Post operative questions should be guided to the Patient Navigator Cochlear Implant Program, Alisha Sun RN at [phone].    Appointments  The patient is scheduled for follow up with SERGEY Omalley at the [facility] and [place] locations.    Activation:   Date: 5/8/24    2nd Stimulation:  Date: 6/11/24    3rd Stimulation:  Date: 7/9/24      Intraoperative testing was completed by SERGEY Louis

## 2024-04-17 NOTE — ANESTHESIA PROCEDURE NOTES
Airway  Date/Time: 4/17/2024 7:40 AM  Urgency: elective    Airway not difficult    Staffing  Performed: CRNA   Authorized by: JESSICA Loaiza    Performed by: JESSICA Loaiza  Patient location during procedure: OR    Indications and Patient Condition  Indications for airway management: anesthesia and airway protection  Spontaneous Ventilation: absent  Sedation level: deep  Preoxygenated: yes  Patient position: sniffing  MILS maintained throughout  Mask difficulty assessment: 1 - vent by mask    Final Airway Details  Final airway type: endotracheal airway      Successful airway: ETT  Cuffed: yes   Successful intubation technique: direct laryngoscopy  Facilitating devices/methods: intubating stylet  Endotracheal tube insertion site: oral  Blade: Jose  Blade size: #3  ETT size (mm): 7.0  Cormack-Lehane Classification: grade I - full view of glottis  Placement verified by: chest auscultation and capnometry   Measured from: lips  ETT to lips (cm): 22  Number of attempts at approach: 1    Additional Comments  Atraumatic

## 2024-04-17 NOTE — OP NOTE
Insertion Cochlear Implant KANSO 2 (L) Operative Note     Date: 2024  OR Location: [facility] [place] [facility] OR    Name: Sue Holland, : 1965, Age: 59 y.o., MRN: [id number], Sex: female    Diagnosis  Pre-op Diagnosis     * ASNHL (asymmetrical sensorineural hearing loss) [H90.3] Post-op Diagnosis     * ASNHL (asymmetrical sensorineural hearing loss) [H90.3]     Procedures  Insertion Cochlear Implant KANSO 2  48685 - [place] COCHLEAR DEVICE IMPLANTATION W/WO MASTOIDECTOMY      Surgeons      * Jas Wetzel - Primary    Resident/Fellow/Other Assistant:  Surgeons and Role:  * No surgeons found with a matching role *    Procedure Summary  Anesthesia: General  ASA: I  Anesthesia Staff: Anesthesiologist: Elmer Rosales MD  CRNA: CHELY Loaiza-CRNA  Estimated Blood Loss: 5mL  Intra-op Medications:   Administrations occurring from 0730 to 1115 on 24:   Medication Name Total Dose   gelatin sponge,absorb-porcine (Gelfoam) sponge 1 each   lidocaine-epinephrine (Xylocaine W/EPI) 1 %-1:100,000 injection 8 mL   sodium chloride 0.9 % irrigation solution 4,000 mL              Anesthesia Record               Intraprocedure I/O Totals          Intake    Remifentanil Drip 0.00 mL    The total shown is the total volume documented since Anesthesia Start was filed.    LR infusion 900.00 mL    Total Intake 900 mL          Specimen: No specimens collected     Staff:   Circulator: Dionicio Kulkarni RN; Cheryle Amin RN  Scrub Person: Ade Fernandes         Drains and/or Catheters: * None in log *    Tourniquet Times:         Implants:  Implants       Type Name Action Serial No.      ENT Implant COCHLEAR, NUCLEUS , PROFILE PLUS W/SLIM MODIOLAR ELECTRODE - [id number] - [id number] Implanted [id number]                PROCEDURE:    1) Left cochlear implantation  2) Use of microscope  3) Unilateral facial nerve monitoring    SURGEON: Jas Wetzel MD    FELLOW: N/a    RESIDENT: N/a    ANESTHESIA:  General.    ESTIMATED BLOOD LOSS: 10 mL.    COMPLICATIONS: None.    FINDINGS:  Implant Brand: Cochlear Brand 632 electrode electrode  Round Window Angle: Favorable  Cochleostomy: Extended round window  Mastoid: Well aerated  Ossicular chain status: Intact  Chorda tympani nerve status: Intact  Facial nerve: Intact  Insertion Time: 30 seconds  Resistance: No  Insertion: Very smooth  Hearing Preservation Approach: No  Healon: No  Intraoperative Topical Steroids: No  SSD Indication: Yes  Subperiosteal Pocket: Yes  Anchoring Sutures: No  Post-Op Testing: Yes.  Normal NRTs, placement check, and impedances except for open circuit on electrode 18  Intraoperative Imaging: No    INDICATIONS FOR PROCEDURE: Sue Holland is a 59 y.o. female who presents with Profound left SNHL. After a failed trial with amplification, a cochlear implant evaluation was completed, noting that the patient is a cochlear implant candidate for the Left ear. Risks of the surgery were discussed with the patient, including bleeding, infection, loss of residual hearing, dizziness, cerebrospinal fluid leak, meningitis, facial paralysis, taste changes, device infection or malfunction requiring removal or replacement. The patient elected to proceed with a Left cochlear implantation.    PROCEDURE IN DETAIL: The patient was identified in preoperative holding with their name and date of birth. After consent was obtained and the risks, alternatives and benefits were explained to the patient, the patient was transported to the operating room and placed supine on the operating table. After adequate general anesthesia was obtained, the patient was strapped to the bed with three straps to secure their position. A timeout was completed with everybody in the OR participating. Facial nerve electrodes were inserted into the Left eyebrow and cheek to monitor the facial nerve, and these were confirmed to be working with a tap test. A total of 8 mL of 1% lidocaine with  1:100,000 epinephrine was then injected into the postauricular sulcus behind the left ear. The patient's left ear and face were then prepped in the usual sterile fashion.    A 15-blade was used to incise a postauricular incision approximately 0.5 cm behind the Left auricular sulcus. All bleeding was cauterized with a bovie electrocautery. A Weitlaner retractor was used to elevate the ear anteriorly, followed by creation of a Palva flap, which was retracted anteriorly. Further subperiosteal elevation allowed identification of the spine of Henle and the osseous external auditory canal. A 5-round bur was then used to drill the mastoid laterally, carefully identifying the tegmen superiorly, the sigmoid sinus posteriorly and thinning the external auditory canal anteriorly. Drilling was carried from a lateral-to-medial fashion, with subsequent identification of the lateral semicircular canal and incus. Once the incus was identified, a 3-round coarse tam bur was used to carefully identify the facial recess, with completion of the recess using a 2-round coarse tam bur. Once the facial recess was opened, the incudostapedial joint was identified. Care was undertaken to enlarge the facial recess to identify the round window inferiorly.    Once the round window was identified, the drill speed was reduced to 10,000 RPM and a 1-fine tam bur was used to drill the overhang of the round window, with care to avoid the facial nerve and opening the round window. Once the round window was adequately exposed, the surgical bed was copiously irrigated with normal saline. A tight subperiosteal pocket was then created for the  stimulator. The cochlear implant was opened and placed into the tight subperiosteal pocket posterior superiorly. An extended round window opening was performed and the electrode was slowly inserted without resistance. Two small pieces of periosteum were harvested and placed in the facial recess to  secure the placement of the electrode and to seal the cochleostomy. The ground electrode was inserted superiorly, medial to the temporalis muscle, anterior to the -stimulator. Cochlear implant testing was performed and demonstrated normal placement check, impedances, and NRTs except for an open circuit on electrode 18.  A piece of Gelfoam was used to secure the electrode in the mastoid cavity. The Palva flap was then closed with 3-0 Vicryl sutures in an interrupted fashion, followed by closure of the subcutaneous tissue with 4-0 Monocryl sutures in an interrupted fashion. The skin was then closed using Dermabond, followed by Khushbu Dressing. The patient was then awakened and returned to the care of anesthesia. The patient tolerated the procedure well and there were no complications.    I completed the entire procedure.    Jas Wetzel MD

## 2024-04-17 NOTE — ANESTHESIA POSTPROCEDURE EVALUATION
Patient: Sue Holland    Procedure Summary       Date: 04/17/24 Room / Location: [facility] [place] 01 / Virtual [facility] [place]    Anesthesia Start: 0727 Anesthesia Stop: 1020    Procedure: Insertion Cochlear Implant KANSO 2 (Left) Diagnosis:       ASNHL (asymmetrical sensorineural hearing loss)      (ASNHL (asymmetrical sensorineural hearing loss) [H90.3])    Surgeons: Jas Wetzel MD Responsible Provider: Elmer Rosales MD    Anesthesia Type: general ASA Status: 1            Anesthesia Type: general    Vitals Value Taken Time   /63 04/17/24 1033   Temp 36.5 °C (97.7 °F) 04/17/24 1033   Pulse 100 04/17/24 1033   Resp 12 04/17/24 1033   SpO2 94 % 04/17/24 1033       Anesthesia Post Evaluation    Patient location during evaluation: PACU  Patient participation: complete - patient cannot participate  Level of consciousness: awake  Pain score: 0  Pain management: adequate  Airway patency: patent  Cardiovascular status: acceptable  Respiratory status: acceptable  Hydration status: acceptable  Postoperative Nausea and Vomiting: none    There were no known notable events for this encounter.

## 2024-04-18 ENCOUNTER — TELEPHONE (OUTPATIENT)
Dept: OTOLARYNGOLOGY | Facility: CLINIC | Age: 59
End: 2024-04-18
Payer: COMMERCIAL

## 2024-04-18 ASSESSMENT — PAIN SCALES - GENERAL: PAINLEVEL_OUTOF10: 2

## 2024-04-18 NOTE — TELEPHONE ENCOUNTER
Contacted patient regarding any potential questions/concerns following her procedure. Patient reminded of discharge medications and post op care. Also reminded patient of post op appointments scheduled with [facility] and Audiology.

## 2024-04-23 ENCOUNTER — OFFICE VISIT (OUTPATIENT)
Dept: OTOLARYNGOLOGY | Facility: CLINIC | Age: 59
End: 2024-04-23
Payer: COMMERCIAL

## 2024-04-23 VITALS — HEIGHT: 59 IN | WEIGHT: 158 LBS | BODY MASS INDEX: 31.85 KG/M2

## 2024-04-23 DIAGNOSIS — H90.3 ASNHL (ASYMMETRICAL SENSORINEURAL HEARING LOSS): Primary | ICD-10-CM

## 2024-04-23 LAB
ATRIAL RATE: 69 BPM
P AXIS: 71 DEGREES
P OFFSET: 213 MS
P ONSET: 157 MS
PR INTERVAL: 136 MS
Q ONSET: 225 MS
QRS COUNT: 11 BEATS
QRS DURATION: 80 MS
QT INTERVAL: 414 MS
QTC CALCULATION(BAZETT): 443 MS
QTC FREDERICIA: 434 MS
R AXIS: 65 DEGREES
T AXIS: 49 DEGREES
T OFFSET: 432 MS
VENTRICULAR RATE: 69 BPM

## 2024-04-23 PROCEDURE — 99024 POSTOP FOLLOW-UP VISIT: CPT | Performed by: STUDENT IN AN ORGANIZED HEALTH CARE EDUCATION/TRAINING PROGRAM

## 2024-04-23 PROCEDURE — 1036F TOBACCO NON-USER: CPT | Performed by: STUDENT IN AN ORGANIZED HEALTH CARE EDUCATION/TRAINING PROGRAM

## 2024-04-23 RX ORDER — ESTRADIOL 0.5 MG/1
0.5 TABLET ORAL DAILY
COMMUNITY
Start: 2024-03-29

## 2024-04-23 NOTE — PROGRESS NOTES
"  Date of surgery: 4/17/2024     CC: Left cochlear implant    SUBJECTIVE:  Patient underwent a Left cochlear implant procedure with cochlear brand 632.  She has increased tinnitus on the left and some dizziness, though this seems to be improving.    ROS:  She denies constitutional symptoms of fatigue, weakness, weight loss or gain, fevers, night sweats.     OBJECTIVE:    The patient is well-developed, well-nourished in no acute distress with a good ability to communicate without hearing aids.  She has normal facial strength and symmetry and is alert and oriented to time, person, and place with appropriate mood and affect.     Vitals:    04/23/24 0946   Weight: 71.7 kg (158 lb)   Height: 1.499 m (4' 11\")        Incision healing well.  She does have hemoptympanum.  The tympanic membrane is intact.    Operative findings:   Implant Brand: Cochlear Brand 632 electrode electrode  Round Window Angle: Favorable  Cochleostomy: Extended round window  Mastoid: Well aerated  Ossicular chain status: Intact  Chorda tympani nerve status: Intact  Facial nerve: Intact  Insertion Time: 30 seconds  Resistance: No  Insertion: Very smooth  Hearing Preservation Approach: No  Healon: No  Intraoperative Topical Steroids: No  SSD Indication: Yes  Subperiosteal Pocket: Yes  Anchoring Sutures: No  Post-Op Testing: Yes.  Normal NRTs, placement check, and impedances except for open circuit on electrode 18  Intraoperative Imaging: No    ASSESSMENT and PLAN:  Doing well.  Operative findings were discussed with the patient.  -- Cleared for activation  -- Follow-up in 6 months     Jas Wetzel MD  "

## 2024-04-30 ENCOUNTER — APPOINTMENT (OUTPATIENT)
Dept: OTOLARYNGOLOGY | Facility: CLINIC | Age: 59
End: 2024-04-30
Payer: COMMERCIAL

## 2024-05-08 ENCOUNTER — CLINICAL SUPPORT (OUTPATIENT)
Dept: AUDIOLOGY | Facility: CLINIC | Age: 59
End: 2024-05-08
Payer: COMMERCIAL

## 2024-05-08 DIAGNOSIS — H90.3 ASNHL (ASYMMETRICAL SENSORINEURAL HEARING LOSS): Primary | ICD-10-CM

## 2024-05-08 PROCEDURE — 92604 REPROGRAM COCHLEAR IMPLT 7/>: CPT

## 2024-05-09 NOTE — PROGRESS NOTES
ADULT COCHLEAR IMPLANT ACTIVATION     Clinical Indication: sensorineural hearing loss    Internal Device:   Surgeon: Jas Wetzel MD  Surgery Date: 2024  Processor:  Kanso 2   Audiologist: Emmie Akbar CCC-KALIE  Activation Date: 2024  Ear: Left    Serial Number:  [id number]  Warranty Date: 2029  Magnet Strength: 3i  Color: Brown    HISTORY:    Name:  Sue Holland  :  1965  Age:  58 y.o.  Date of Evaluation:  2024     HISTORY  Sue Holland, age 58 years, arrives on 2024 for the activation of her left Kanso 2 used in conjunction with a  implant placed by Dr. Wetzel on 2024. She reports dizziness and headaches post operatively; dizziness has subsided but headaches are persistent.     RECALL  She reports her first bilateral sudden sensorineural hearing loss (SSNHL) occurred about 20 years ago. It was treated with steroid injections that improved her hearing. About 2 years later she reports a second SSNHL that was treated; her left hearing never recovered. She reports since that time she experiences bilateral ear fullness/pressure, fluctuating hearing loss, and vertigo. She has left debilitating tinnitus with a recent start of right tinnitus. She has never worn hearing aids and reports at the first episode of SSNHL a VNG/ENG was completed for Meniere's disease.        Position Program Description   1 MAP 1 Population Mean SCAN   2 MAP 2 C+5 SCAN   3 MAP 3 C+10 SCAN   4 MAP 5 C+15 SCAN       PROGRAM PARAMETERS:  Programming was completed using the external processor.  Impedances were within normal limits.  .  Programming follow the [facility] guidelines by using a Population Mean programming approach. The patient was subjectively satisfied with today's programming and denied any loudness discomfort. She was able to repeat ling sounds and open set words with her better ear plugged.     Use and care of the external equipment were reviewed.  The  patient demonstrated understanding of how to use the equipment; including, charging processor, basic knowledge of indicator lights, placement and removal of external processor onto internal processor, and program/volume control via their remote assistant or cell phone application. The warranty was discussed and the registration paperwork was completed.  The patient was counseled regarding realistic expectations and communications strategies.     We discussed parts and pieces within her at home kit. Demonstrated portable charged. Paired and demonstrated mini microphone     The patient is to start in P1 for 2-3 days and then move to P2 if able. The patient is to continue working through programs as she is able. The patient should return for her 2 week follow-up visit, sooner if needed.    ACTIVATION QUESTIONS  Did the patient meet with the [email or web address]  prior to surgery?   Did the patient find this meeting helpful? N/A  Would the patient recommend this meeting to a family member or friend who is considering a cochlear implant? N/A  Was the patient more prepared for CI programming/activation due to meeting with the [email or web address] ? N/A.     Time spent with patient:  90 minutes    Completed by:  Naomie Holland, CCC-A, [facility]  Licensed Audiologist

## 2024-06-11 ENCOUNTER — CLINICAL SUPPORT (OUTPATIENT)
Dept: AUDIOLOGY | Facility: CLINIC | Age: 59
End: 2024-06-11
Payer: COMMERCIAL

## 2024-06-11 DIAGNOSIS — H90.3 ASNHL (ASYMMETRICAL SENSORINEURAL HEARING LOSS): Primary | ICD-10-CM

## 2024-06-11 DIAGNOSIS — Z96.21 COCHLEAR IMPLANT IN PLACE: ICD-10-CM

## 2024-06-11 PROCEDURE — 92603 COCHLEAR IMPLT F/UP EXAM 7/>: CPT

## 2024-06-11 NOTE — PROGRESS NOTES
ADULT COCHLEAR IMPLANT 2ND STIMULATION     Clinical Indication: sensorineural hearing loss    Internal Device:   Surgeon: Jas Wetzel MD  Surgery Date: 4/17/2024  Processor:  Kanso 2   Audiologist: Emmie Akbar, CCC-A  Activation Date: 5/8/2024  Ear: Left    Serial Number:  [id number]  Warranty Date: 5/7/2029  Magnet Strength: 4i  Color: Brown    HISTORY:  Sue Holland, age 58 years, arrives on 6/11/2024 for the 2nd stimulation of her left Kanso 2 used in conjunction with a  implant placed by Dr. Wetzel on 4/17/2024. She reports while streaming she is able to understand speech. She is struggling to distinguish some consonants and hopes it becomes more clear     RECALL  She reports her first bilateral sudden sensorineural hearing loss (SSNHL) occurred about 20 years ago. It was treated with steroid injections that improved her hearing. About 2 years later she reports a second SSNHL that was treated; her left hearing never recovered. She reports since that time she experiences bilateral ear fullness/pressure, fluctuating hearing loss, and vertigo. She has left debilitating tinnitus with a recent start of right tinnitus. She has never worn hearing aids and reports at the first episode of SSNHL a VNG/ENG was completed for Meniere's disease.        Position Program Description   1 MAP 6 Psychmap SCAN   2 MAP 7 C+5 SCAN   3 MAP 8 C+10 SCAN   4 MAP 10 C+15 SCAN       PROGRAM PARAMETERS:  Programming was completed using the external processor.  Impedances were within normal limits.  Programmed using a 10 point loudness scale The patient was subjectively satisfied with today's programming and denied any loudness discomfort. She was able to repeat ling sounds and open set words with her better ear plugged.     Use and care of the external equipment were reviewed.  The patient demonstrated understanding of how to use the equipment; including, charging processor, basic knowledge of indicator  lights, placement and removal of external processor onto internal processor, and program/volume control via their remote assistant or cell phone application. The warranty was discussed and the registration paperwork was completed.  The patient was counseled regarding realistic expectations and communications strategies.     We previously discussed parts and pieces within her at home kit. Demonstrated portable charged. Paired and demonstrated mini microphone     The patient is to start in P1 for 2-3 days and then move to P2 if able. The patient is to continue working through programs as she is able. The patient should return for her 2 week follow-up visit, sooner if needed.    Time spent with patient:  60 minutes    Completed by:  Naomie Holland, CCC-A, [facility]  Licensed Audiologist     no

## 2024-07-09 ENCOUNTER — APPOINTMENT (OUTPATIENT)
Dept: AUDIOLOGY | Facility: CLINIC | Age: 59
End: 2024-07-09
Payer: COMMERCIAL

## 2024-07-09 DIAGNOSIS — Z96.21 COCHLEAR IMPLANT IN PLACE: ICD-10-CM

## 2024-07-09 DIAGNOSIS — H90.3 ASNHL (ASYMMETRICAL SENSORINEURAL HEARING LOSS): Primary | ICD-10-CM

## 2024-07-09 PROCEDURE — 92603 COCHLEAR IMPLT F/UP EXAM 7/>: CPT

## 2024-07-09 PROCEDURE — 92626 EVAL AUD FUNCJ 1ST HOUR: CPT

## 2024-07-09 NOTE — PROGRESS NOTES
ADULT COCHLEAR IMPLANT 3RD STIMULATION     Clinical Indication: sensorineural hearing loss    Internal Device:   Surgeon: Jas Wetzel MD  Surgery Date: 4/17/2024  Processor:  Kanso 2   Audiologist: Emmie Akbar, CCC-A  Activation Date: 5/8/2024  Ear: Left    Serial Number:  [id number]  Warranty Date: 5/7/2029  Magnet Strength: 4i  Color: Brown    HISTORY:  Sue Holland, age 58 years, arrives on 7/9/2024 for the 3rd stimulation of her left Kanso 2 used in conjunction with a  implant placed by Dr. Wetzel on 4/17/2024. She reports while streaming she is able to understand speech. She is continuing to struggle in live listening environments. She reports she is noticing improvements when streaming.     RECALL  She reports her first bilateral sudden sensorineural hearing loss (SSNHL) occurred about 20 years ago. It was treated with steroid injections that improved her hearing. About 2 years later she reports a second SSNHL that was treated; her left hearing never recovered. She reports since that time she experiences bilateral ear fullness/pressure, fluctuating hearing loss, and vertigo. She has left debilitating tinnitus with a recent start of right tinnitus. She has never worn hearing aids and reports at the first episode of SSNHL a VNG/ENG was completed for Meniere's disease.        Position Program Description   1 MAP 12 Psychmap SCAN   2 MAP 12 GROUPS    3 MAP 12 RESTAURANT    4 MAP 12 OUTDOOR        PROGRAM PARAMETERS:  Programming was completed using the external processor.  Impedances were within normal limits. Frequency Allocation adjusted to 438 at LF. Patient reported some noticeable improvement. Programmed using a 10 point loudness scale The patient was subjectively satisfied with today's programming and denied any loudness discomfort.     Use and care of the external equipment were reviewed.  The patient demonstrated understanding of how to use the  equipment; including, charging processor, basic knowledge of indicator lights, placement and removal of external processor onto internal processor, and program/volume control via their remote assistant or cell phone application. The warranty was discussed and the registration paperwork was completed.  The patient was counseled regarding realistic expectations and communications strategies.     We previously discussed parts and pieces within her at home kit. Demonstrated portable charged. Paired and demonstrated mini microphone     QUALITY OF LIFE (QOL) SURVEY  QOL 35:                    Raw Score                         Converted Score  Communication              36                                            56.06  Emotional                       23                                            82.90  Entertainment                 23                                            79.42  Environment                   16                                            48.85  Listening Effort                15                                            44.82       Social                              24                                            89.6  Global                             40                                            61.26    L = Left Kanso 2 (opposite ear masked)  SRT =40 dB HL  CNC words in quiet at 50 dB HL = 0%  AzBio in quiet at 50 dB HL  = 5%  AzBio in noise at 50 dB HL with 40 dB noise  = DNT%  Pure tone responses from 250 Hz - 6000 Hz were in the normal to mild hearing loss range.     Time spent with patient:  70 minutes  Follow up in 3 months  Continue AVT at home   Contact with questions    Completed by:  Naomie Holland, CCC-A, [facility]  Licensed Audiologist

## 2024-10-23 ENCOUNTER — APPOINTMENT (OUTPATIENT)
Dept: OTOLARYNGOLOGY | Facility: CLINIC | Age: 59
End: 2024-10-23
Payer: COMMERCIAL

## 2024-10-23 VITALS — BODY MASS INDEX: 31.85 KG/M2 | HEIGHT: 59 IN | WEIGHT: 158 LBS

## 2024-10-23 DIAGNOSIS — Z96.21 COCHLEAR IMPLANT IN PLACE: ICD-10-CM

## 2024-10-23 DIAGNOSIS — H90.3 ASNHL (ASYMMETRICAL SENSORINEURAL HEARING LOSS): ICD-10-CM

## 2024-10-23 DIAGNOSIS — H90.3 BILATERAL SENSORINEURAL HEARING LOSS: Primary | ICD-10-CM

## 2024-10-23 DIAGNOSIS — H81.02 MENIERE'S DISEASE OF LEFT EAR: ICD-10-CM

## 2024-10-23 DIAGNOSIS — H93.12 TINNITUS OF LEFT EAR: ICD-10-CM

## 2024-10-23 PROCEDURE — 3008F BODY MASS INDEX DOCD: CPT | Performed by: OTOLARYNGOLOGY

## 2024-10-23 PROCEDURE — 99214 OFFICE O/P EST MOD 30 MIN: CPT | Performed by: OTOLARYNGOLOGY

## 2024-10-23 NOTE — LETTER
"2024     Vinicio Rich MD  [address] [place] [address]  [address]  [facility] [address]    Patient: Sue Holland   YOB: 1965   Date of Visit: 10/23/2024       Dear Dr. Vinicio Rich MD:    Thank you for referring Sue Holland to me for evaluation. Below are my notes for this consultation.  If you have questions, please do not hesitate to call me. I look forward to following your patient along with you.       Sincerely,     Matthew Gonzalez MD      CC: WILLIAM Lopse, CCC-A  Sussy Basurto, SLP  Sarah Stockton, DO  ______________________________________________________________________________________            Reason for Consult:  Follow-up     Subjective  History Of Present Illness:  Sue Holland is a 59 y.o. female with left-sided deafness diagnosed in . She has a MRI that ruled out retrocochlear pathology.     On 2024, she underwent a left-sided cochlear implant with a  electrode performed by Dr. Wetzel. After surgery, she had some degree of disequilibrium that improved at her last visit as well as tinnitus. She is here for a follow up.      Since her visit with Dr. Wetzel, she has been using her cochlear implant. She is able to understand language, but reports \"it picks up other noise that is annoying.\" She is followed by audiology and has not seen out SLPs. She has intermittent disequilibrium and dizziness. The dizziness is variable. She had a fall on Monday. She recently started caffeine restriction and low salt diet.      Past Medical History:  She has a past medical history of HL (hearing loss) (), PONV (postoperative nausea and vomiting), and Tinnitus ().    Surgical History:  She has a past surgical history that includes Hysterectomy and  section, low transverse.     Social History:  She reports that she has never smoked. She has never used smokeless tobacco. She reports that she does not drink alcohol and " "does not use drugs.    Family History:  family history includes Cancer in her father and mother; Hypertension in her mother.     Medications:  Current Outpatient Medications   Medication Instructions   • estradiol (ESTRACE) 0.5 mg, oral, Daily   • ondansetron ODT (ZOFRAN-ODT) 4 mg, oral, Every 8 hours PRN   • sennosides-docusate sodium (Jenifer-Colace) 8.6-50 mg tablet 1 tablet, oral, Daily PRN      Allergies:  Patient has no known allergies.    Review of Systems:   A comprehensive 10-point review of systems was obtained including constitutional, neurological, HEENT, pulmonary, cardiovascular, genito-urinary, and other pertinent systems and was negative except as noted in the HPI.     Objective  Physical Exam:  Last Recorded Vitals: Height 1.499 m (4' 11\"), weight 71.7 kg (158 lb).    On physical exam, the patient is a well-nourished, well-developed patient, in no acute distress, able to communicate without assistance in English language. Head and face is atraumatic and normocephalic. Salivary glands are intact. Facial strength is symmetrical bilaterally.       On ear examination:  Right ear: The patient has an open and patent ear canal. The tympanic membrane is intact.  AC>BC  Left ear: The patient has an open and patent ear canal. The tympanic membrane is intact. The magnet is well placed.   Cannot discern sound  The Roque is right    On vestibular exam, the patient has no spontaneous nystagmus, no headshake nystagmus, no head-thrust nystagmus, and no nystagmus on hyperventilation or Valsalva maneuvers. Nehemias-Hallpike maneuver is negative bilaterally.       On neuro exam, the patient is alert and oriented x3, cranial nerves are grossly intact, cerebellar exam is normal.      The rest of the exam, including anterior rhinoscopy, oropharyngeal exam, neck exam, and cardiovascular exam, were normal including no palpable lymphadenopathies, thyroid in the midline position, normal pulses, and normal chest excursion.   "     Reviewed Results:  Audiology Testing:  I reviewed her last programming session from 07/2024:  Very good levels. Minimal improvement of speech understanding with 0% on CNC and 5% on AzBio.       I personally reviewed the audiogram from 09/2023 that showed:   Right ear: normal hearing . Discrimination: 96%   Left ear: profound SNHL . Discrimination: 0%      Cochlear Implant Evaluation:  I reviewed the preop CI evaluation from 09/2023 that showed:  Right: Hearing in Quiet: - CNC: 100%; - Azbio: 100%      Hearing in Noise: - Azbio @ +10SNR: 97%; - Azbio @ +5SNR : DNT      Left: Hearing in Quiet:  - CNC: 0%; - Azbio: 0%      Hearing in Noise:   - Azbio @ +10SNR: 0%; - Azbio @ +5SNR : DNT                   Imaging:  I reviewed her MRI from 09/2023 that shows no evidence of retrocochlear     Procedure:  None    Assessment/Plan    1. Bilateral sensorineural hearing loss    2. ASNHL (asymmetrical sensorineural hearing loss)    3. Tinnitus of left ear    4. Meniere's disease of left ear        In summary, Sue Holland is a 59 y.o. female with sudden drop in hearing on the left with dizzy spells. Her hearing did not recover after steroids and Dr. Wetzel placed a cochlear implant. She has noted improvement in hearing and speech understanding.     She has dizziness that lasts from minutes to hours. She notes this worsens with salt intake. This is consistent with left-sided Meniere's disease. Today I put her on a low salt diet and caffeine restrictions.     We hold of on prescribing a diuretic to see if those two improve her symptoms.    She will be set up to see our audiologist in [place] and oral rehabilitation at the [facility].    I will see her back in 6 months, or sooner if there are any changes.      Scribe Attestation  By signing my name below, I, Ines Washington , Cyndy attest that this documentation has been prepared under the direction and in the presence of Matthew Gonzalez MD.     ____________________________________________________  Matthew Leslie MD  Professor and Chief   Otology/Neurotology/Lateral Skull-Base Surgery   [facility]

## 2024-10-23 NOTE — PROGRESS NOTES
"        Reason for Consult:  Follow-up     Subjective   History Of Present Illness:  Sue Holland is a 59 y.o. female with left-sided deafness diagnosed in . She has a MRI that ruled out retrocochlear pathology.     On 2024, she underwent a left-sided cochlear implant with a  electrode performed by Dr. Wetzel. After surgery, she had some degree of disequilibrium that improved at her last visit as well as tinnitus. She is here for a follow up.      Since her visit with Dr. Wetzel, she has been using her cochlear implant. She is able to understand language, but reports \"it picks up other noise that is annoying.\" She is followed by audiology and has not seen out SLPs. She has intermittent disequilibrium and dizziness. The dizziness is variable. She had a fall on Monday. She recently started caffeine restriction and low salt diet.      Past Medical History:  She has a past medical history of HL (hearing loss) (), PONV (postoperative nausea and vomiting), and Tinnitus ().    Surgical History:  She has a past surgical history that includes Hysterectomy and  section, low transverse.     Social History:  She reports that she has never smoked. She has never used smokeless tobacco. She reports that she does not drink alcohol and does not use drugs.    Family History:  family history includes Cancer in her father and mother; Hypertension in her mother.     Medications:  Current Outpatient Medications   Medication Instructions    estradiol (ESTRACE) 0.5 mg, oral, Daily    ondansetron ODT (ZOFRAN-ODT) 4 mg, oral, Every 8 hours PRN    sennosides-docusate sodium (Jenifer-Colace) 8.6-50 mg tablet 1 tablet, oral, Daily PRN      Allergies:  Patient has no known allergies.    Review of Systems:   A comprehensive 10-point review of systems was obtained including constitutional, neurological, HEENT, pulmonary, cardiovascular, genito-urinary, and other pertinent systems and was negative except as noted in " "the HPI.     Objective   Physical Exam:  Last Recorded Vitals: Height 1.499 m (4' 11\"), weight 71.7 kg (158 lb).    On physical exam, the patient is a well-nourished, well-developed patient, in no acute distress, able to communicate without assistance in English language. Head and face is atraumatic and normocephalic. Salivary glands are intact. Facial strength is symmetrical bilaterally.       On ear examination:  Right ear: The patient has an open and patent ear canal. The tympanic membrane is intact.  AC>BC  Left ear: The patient has an open and patent ear canal. The tympanic membrane is intact. The magnet is well placed.   Cannot discern sound  The Roque is right    On vestibular exam, the patient has no spontaneous nystagmus, no headshake nystagmus, no head-thrust nystagmus, and no nystagmus on hyperventilation or Valsalva maneuvers. Nehemias-Hallpike maneuver is negative bilaterally.       On neuro exam, the patient is alert and oriented x3, cranial nerves are grossly intact, cerebellar exam is normal.      The rest of the exam, including anterior rhinoscopy, oropharyngeal exam, neck exam, and cardiovascular exam, were normal including no palpable lymphadenopathies, thyroid in the midline position, normal pulses, and normal chest excursion.       Reviewed Results:  Audiology Testing:  I reviewed her last programming session from 07/2024:  Very good levels. Minimal improvement of speech understanding with 0% on CNC and 5% on AzBio.       I personally reviewed the audiogram from 09/2023 that showed:   Right ear: normal hearing . Discrimination: 96%   Left ear: profound SNHL . Discrimination: 0%      Cochlear Implant Evaluation:  I reviewed the preop CI evaluation from 09/2023 that showed:  Right: Hearing in Quiet: - CNC: 100%; - Azbio: 100%      Hearing in Noise: - Azbio @ +10SNR: 97%; - Azbio @ +5SNR : DNT      Left: Hearing in Quiet:  - CNC: 0%; - Azbio: 0%      Hearing in Noise:   - Azbio @ +10SNR: 0%; - Azbio @ " +5SNR : DNT                   Imaging:  I reviewed her MRI from 09/2023 that shows no evidence of retrocochlear     Procedure:  None    Assessment/Plan     1. Bilateral sensorineural hearing loss    2. ASNHL (asymmetrical sensorineural hearing loss)    3. Tinnitus of left ear    4. Meniere's disease of left ear        In summary, Sue Holland is a 59 y.o. female with sudden drop in hearing on the left with dizzy spells. Her hearing did not recover after steroids and Dr. Wetzel placed a cochlear implant. She has noted improvement in hearing and speech understanding.     She has dizziness that lasts from minutes to hours. She notes this worsens with salt intake. This is consistent with left-sided Meniere's disease. Today I put her on a low salt diet and caffeine restrictions.     We hold of on prescribing a diuretic to see if those two improve her symptoms.    She will be set up to see our audiologist in [place] and oral rehabilitation at the [place] office.    I will see her back in 6 months, or sooner if there are any changes.      Scribe Attestation  By signing my name below, I, Ines Washington , Cyndy attest that this documentation has been prepared under the direction and in the presence of Matthew Gonzalez MD.    ____________________________________________________  Matthew Leslie MD  Professor and Chief   Otology/Neurotology/Lateral Skull-Base Surgery   [facility]

## 2024-10-23 NOTE — Clinical Note
Patient implanted with [place] in 04/2024. She has only had 2 programming sessions, last in 07/2024. She needs next appt probably with Prashanth in 6 months, she is doing slow progression, or sooner if Prashanth considers it. Please also schedule appt for Aural rehab with Sussy at [place]. Patient ok with traveling there.

## 2024-10-23 NOTE — PATIENT INSTRUCTIONS
Patient Education -  Meniere's Disease      What Is Meniere's Disease (MD)?  MD is a disorder of the inner ear. With MD, you will have symptoms that include 2 or more episodes of vertigo that last between 20 minutes to 24 hours. Vertigo is the feeling of spinning or movement when you are not moving. Vertigo is different from dizziness since dizziness can mean feeling lightheaded or feeling like you will pass out. Your health care provider will ask you to describe how you feel to see if you have vertigo or if it is dizziness. Along with vertigo, you may have hearing loss that changes from better to worse (fluctuating hearing loss), ringing or buzzing in the ear (tinnitus), and your ear feeling like it is full or plugged (ear pressure). MD attacks are random and usually happen about 6 to 11 times a year.    What Causes MD?  The exact cause of MD is not known. However, it is believed that MD is caused by having too much fluid in one of the spaces in the inner ear.    Who Is at Risk for MD?  MD is seen almost only in adults, and a very small percentage is seen in people under 18 years of age. MD is seen mostly in adults aged 40 to 60 years.    When Should I See a Health Care Provider about MD?  You should see a health care provider if you have symptoms including feeling like you are moving or spinning when you are not moving (vertigo), hearing loss that changes from better to worse (fluctuating hearing loss), ringing or buzzing in the ear (tinnitus), and/or your ear feeling full or plugged (ear pressure).    How Is MD diagnosed?  To be diagnosed with MD, your health care provider will ask for the history of your symptoms. Your symptoms may include having 2 or more attacks of vertigo, fluctuating hearing loss, ear pressure, and/or ringing in the ear. Because MD is a clinical diagnosis, it can take several office visits with your health care provider. There are often other causes to vertigo, ear pressure, and  ringing in the ear that have different treatments, so your health care provider may run different tests to find the causes of those symptoms.  Your health care provider will ask you to describe the vertigo in detail and will ask questions about other ear problems (infections, surgeries) and other issues like headaches and migraines. Your health care provider will have to get a full history of your symptoms, which will require a lot of questions and possibly having you keep a journal to track those symptoms. A hearing test (audiogram) should be offered because changes in hearing are a part of MD that is different from other causes of vertigo. If there is hearing loss in just one ear, or if the hearing is different in one ear, your health care provider may offer a magnetic resonance imaging (MRI) scan to rule out a possible benign (noncancerous) tumor in the inner ear. There are also other tests for vertigo that are not needed to diagnose MD.    How Does MD Affect Quality of Life?  MD comes and goes, so there will be times when you feel better and times you feel sick. You may have a hard time hearing, paying attention, and staying awake. When you have a vertigo attack, you may feel sick and be unable to do daily activities. Many people with MD are scared and worry about the next attack. It is important to see your health care provider regularly so they can answer your questions and find the best options to help you.    How Is MD Treated?  There is no cure for MD; however, there are several treatments to manage symptoms of MD. Your treatment will be a decision between you and your health care provider. Some of the ways to treat the vertigo, ringing in the ear, and hearing loss, include the following:  · changing your diet to reduce salt, alcohol, and caffeine  · Medications for vertigo, allergies, or to remove excess body fluid  · Physical therapy  · Hearing aids  · Shots into the ear of steroids or strong  antibiotics  · Surgeries to try to stop the vertigo (may also cause deafness and ongoing generalized imbalance)  Your health care provider should talk with you about the pros and cons of each treatment option to come up with an individual treatment plan that is best for you. It may take multiple visits with your health care provider to create your treatment plan. Having an advocate (family member or friend) come with you during these discussions with your health care provider can also help you make a good treatment decision.    How Can MD Be Prevented/How Can I Manage the Symptoms of MD?  The cause of MD is not known, so there is not a way to prevent it. However, there are ways to help with the symptoms of MD with diet, medications, therapy, and surgery. You may want to join a support group with other people who have MD or go to counseling to help with stress and anxiety. Keeping a journal record of your symptoms and possible triggers will help you learn what to avoid and what helps with the symptoms.                                                   LOW SODIUM DIET       GOAL: 8594-4631 mg of Sodium per day          Meat, Poultry, and Fish    mg  Sirloin Steak (3 oz)     53  Baked [place] (3 oz)     55  Chicken Breast, roasted (3 oz)    64  Ground Beef lise (4 oz)    87  Chicken Leg, fried (2.5 oz)    194  tuna, canned (3 oz)     468  Hot Dog (1)      504  Salami (2 slices)     607  Fast-food Hamburger (4 oz)7    763  Corned Beef (3 oz)     802  Ham, canned (3 oz)     908  Smoked [place] (3 oz)    1700    Soups, Vegetables and Fruit    mg  Apple (1)      0  Banana (1)      1  Mixed Vegetables, frozen (1 cup)   64  Mixed Vegetables, canned (1 cup)   243  Chicken Noodle Soup, caned (1 cup)   1106  Tomato Sauce, canned (1 cup)   1482    Bread and Grains     mg  Oatmeal, cooked without salt (1 cup)   2  Wheat Bread (1 slice)     106  Italian Bread (1 slice)     176  Bagel (1)      245  English Muffin  (1)     378    Dairy Products     mg  Butter, salted (1 Tsp)     116  Milk (1 cup)      122  Sour Cream (1 cup)     123  Margarine (1 Tbsp)     134  Buttermilk (1 cup)     257  Cheddar Cheese (1 cup)    701  Cottage Cheese (1 cup)    911  Parmesan Cheese (1 cup)    1861    Snacks, Drinks, Condiments and Desserts  mg  Orange Juice (1 cup)     2  Peanuts, unsalted (1 cup)    22  Chocolate Fudge (1 oz)    54  Diet Cola, with saccharin (12 oz)   75  Club Soda (12 oz)     78  Potato Chips (10)     94  Mustard (1 Tbsp)     129  Ketchup (1 Tbsp)     156  Baked Custard (1 cup)     209  Hard Pretzel, Swiss style (1)    258  Shortbread Cookies (2)    300  Chocolate Pudding (1 cup)    880  Apple Pie (1 slice)     476  Peanuts, salted (1 cup)    626  Vegetable Juice Cocktail (1 cup)   883  Dill Pickle (1)      928  Pretzel Twists (10)     966                             Patient Education -  Tinnitus      Background  This plain language summary serves as an overview in explaining tinnitus and managing its symptoms. Tinnitus is a sensation of noise or ringing in the ears or head, when there is no real sound. Tinnitus (pronounced ten / ih / tus) affects 10-15% of adults in the [place]. Some people experience tinnitus that goes away on its own. Other people have symptoms that last 6 months or longer and interfere with their life. The information in this summary is based on the 2014 Clinical Practice Guideline: Tinnitus. The guideline includes evidence-based research to support more effective diagnosis and treatment of tinnitus.     What is Tinnitus?   Over 50 million Americans have experienced tinnitus, or ringing in ears, which is the perception of sound without an external source being present.    About one in five people with tinnitus have bothersome tinnitus, which negatively affects their quality of life and/or functional health. Tinnitus may be an intermittent or continuous sound in one or both ears. Its pitch can go  from a low roar to a high squeal or whine, or it can have many sounds.    Persistent tinnitus lasts more than six months. Prior to any treatment, it is important to undergo a thorough examination and evaluation by an ENT (ears, nose, and throat) specialist, or otolaryngologist, and an audiologist. Your understanding of tinnitus and its causes will enhance your treatment.    What Are the Symptoms of Tinnitus?  Tinnitus is not a disease per se, but a common symptom, and because it involves the perception of hearing sound or sounds in one or both ears, it is commonly associated with the hearing system. In fact, various parts of the hearing system, including the inner ear, are often responsible for this symptom. At times, it is relatively easy to associate the symptom of tinnitus with specific problems affecting the hearing system; at other times, the connection is less clear.    Common symptoms of tinnitus include:  - Constant high- or low-pitched ringing in ears  - Intermittent or constant roaring in ears  - Pulsation or beating noises in ears  - Associated with or without hearing loss    What Causes Tinnitus?  Most tinnitus is primary tinnitus, where no cause can be identified aside from hearing loss. Secondary tinnitus is associated with a specific underlying cause that may be treatable. Your ENT specialist will help you distinguish whether your tinnitus is primary or secondary.    Tinnitus may be caused by different parts of the hearing system. The outer ear (pinna and ear canal) may be involved. Excessive ear wax, especially if the wax touches the ear drum, causing pressure and changing how the ear drum vibrates, can result in tinnitus.    Middle ear problems can also cause tinnitus, including middle ear infection (common) and otosclerosis (uncommon), which hardens the tiny ear bones or ossicles. Another rare cause of tinnitus from the middle ear that does not result in hearing loss is muscle spasms in one of the  two tiny muscles in the ear. In this case, the tinnitus can be intermittent and sometimes your examiner may also be able to hear the sounds.    Most subjective tinnitus associated with the hearing system originates in the inner ear. Damage and loss of the tiny sensory hair cells in the inner ear (that can be caused by different factors such as noise damage, medications, and age) may also be associated with tinnitus.    One of the preventable causes of tinnitus is excessive noise exposure. In some instances of noise exposure, tinnitus can be noticed even before hearing loss develops, so be careful to take special precautions to protect your ears and hearing in noisy environments.    Medications can also damage inner ear hair cells and cause tinnitus. These include both non-prescription medications such as aspirin and acetaminophen, when taken in high doses, and prescription medication including certain diuretics and antibiotics. As we age, the incidence of tinnitus increases.    Tinnitus may also originate from an abnormality in, or near, the hearing portion of the brain. These include a variety of uncommon disorders such as damage from head trauma, or a benign tumor called “vestibular schwannoma” (acoustic neuroma).    Tinnitus that sounds like your pulse or heartbeat is known as “pulsatile tinnitus.” Infrequently, pulsatile tinnitus may signal the presence of cardiovascular disease, narrowed arteries, or a vascular tumor in your head and neck, or ear. If you are experiencing this type of tinnitus, you should consult a physician as soon as possible for evaluation.    Finally, non-auditory conditions and lifestyle factors can exacerbate tinnitus. Medical conditions such as temporomandibular joint arthralgia (TMJ), depression, anxiety, insomnia, and muscular stress and fatigue may lead to, or exacerbate, tinnitus.    What Are the Treatment Options?  When you are evaluated for tinnitus, the first thing the doctor will  do is obtain a complete history and perform a thorough, targeted physical examination. If your tinnitus is one-sided (unilateral), associated with hearing loss, or persistent, a hearing test, or audiogram, should be ordered. There is typically no need for radiologic testing (X-ray, CT scan or MRI scan) unless your tinnitus is pulsatile or associated with uneven, asymmetric hearing loss or neurological abnormalities. Your doctor will determine how bothersome your tinnitus is by asking you certain questions or having you complete a self-assessment questionnaire.    Although there is no one “cure” for tinnitus, there are several options available that can help patients with tinnitus. Because tinnitus is relatively common and not always worrisome, not all patients need an evaluation. If your ENT specialist finds a specific cause for your tinnitus, they may be able to offer specific treatment to eliminate the noise. This may include removing wax or hair from your ear canal, treating middle ear fluid, treating arthritis in the jaw joint, etc. For many patients who have experienced tinnitus for less than six months, its natural course is to improve over time, and most people do not go on to have persistent, bothersome tinnitus.    Some patients with hearing loss and tinnitus have improvement with the use of hearing aids, with or without built-in ear-level maskers. Sound therapies that involve simple things like background music or noise or specialized ear-level maskers may be a reasonable treatment option. The effects of tinnitus on quality of life may also be improved by cognitive behavioral therapy (CBT) counseling, which usually involves a series of weekly sessions led by a trained professional.    Tinnitus can be so bothersome in some patients that it causes depression or anxiety; additionally, in a patient with depression and/or anxiety, it may be very difficult to tolerate tinnitus. Consultation with a psychiatrist  or psychologist with treatment directed to the underlying condition can be beneficial.    Routine prescription of medications including antidepressants, anticonvulsants, anxiolytics, or intratympanic injection of medications is not recommended for treating tinnitus without an underlying or associated medical problem that may benefit from such treatment.    Dietary supplements for tinnitus treatment are frequently advertised on the internet, television, and radio, but there is no evidence that supplements such as ginkgo biloba, melatonin, zinc, Lipoflavonoid, and vitamin supplements are beneficial for tinnitus.    Acupuncture may or may not be help your tinnitus; there are not enough quality studies of this type of treatment to make a recommendation. Transcranial magnetic stimulation is a new modality, or therapeutic agent, but its long-term benefits are unproven and cannot be recommended for treating tinnitus at this time.                  [employer].

## 2024-10-26 PROBLEM — Z96.21 COCHLEAR IMPLANT IN PLACE: Status: ACTIVE | Noted: 2024-10-26

## 2024-11-07 ENCOUNTER — APPOINTMENT (OUTPATIENT)
Dept: AUDIOLOGY | Facility: CLINIC | Age: 59
End: 2024-11-07
Payer: COMMERCIAL

## 2024-11-07 DIAGNOSIS — R42 DIZZINESS: ICD-10-CM

## 2024-11-07 DIAGNOSIS — Z96.21 COCHLEAR IMPLANT IN PLACE: ICD-10-CM

## 2024-11-07 DIAGNOSIS — H90.3 ASNHL (ASYMMETRICAL SENSORINEURAL HEARING LOSS): Primary | ICD-10-CM

## 2024-11-07 PROCEDURE — 92604 REPROGRAM COCHLEAR IMPLT 7/>: CPT | Performed by: AUDIOLOGIST

## 2024-11-07 PROCEDURE — 92626 EVAL AUD FUNCJ 1ST HOUR: CPT | Performed by: AUDIOLOGIST

## 2024-11-07 NOTE — PROGRESS NOTES
COCHLEAR IMPLANT PROGRAMMING and FUNCTIONAL TESTING       Name:  Sue Holland  :  1965  Age:  59 y.o.  Date of Evaluation:  2024     RIGHT DEVICE  External Device: None    LEFT DEVICE  External Device: Cochlear [id number] (KANSO 2) sound processor  Internal Device: Cochlear [employer]   [id number]   Surgeon: Matthew Leslie MD  Implantation Date: 2024  Activation Date: 2024    HISTORY  Sue Holland arrives today for programming and optimization of the left cochlear implant used in conjunction with a Cochlear [id number] (KANSO 2) sound processor. The patient reports daily use of the sound processor during all waking moments. She reports tinnitus and balance have improve since restricting salt and caffeine intake in her diet. Does daily listening practice streaming directly to her CI only, reports obtaining % on word tasks.    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    PROGRAMMING  Headset pressure, magnet strength (4I), and incision site were checked with no problems noted. Datalogging revealed 9+ hours of use per day with 1+ hours in speech, on average since the last appointment.    Electrode impedances, used to monitor internal device function, were measured across the electrode array.  Impedance measures were within normal limits for all electrodes, in each stimulation mode with exception of ch. 18 which is open circuit. Programming began from MAP 12.  Current parameters of MP1+2 stimulation mode, an ACE speech processing strategy, 8 maxima, -6983 Hz and a 900 Hz stimulation rate were maintained.  Threshold (T) levels were measuring using up-down bracketing method on a variety of channels with the remaining channels interpolated. Comfort (C) levels were measuring using psychophysical loudness scaling method on a variety of channels with the  remaining channels interpolated.  C levels were swept for loudness balancing (MAP 13). Changed FAT to 188-7938 Hz and she prefers this setting today reporting its less sharp (MAP 14). No facial stimulation or non-auditory stimulation observed during live speech. Current programming consists of:  Program 1 - MAP 14 - SCAN 2  enabled WNR/NR  Standard/Fixed/Adaptive Nba  V: 6  S: 10  Program 2 - MAP 14 - ADRO + AutoS  enabled WNR/NR  Adaptive Nba  V: 6  S: 10  Program 3 - MAP 13 - SCAN 2  enabled WNR/NR  Standard/Fixed/Adaptive Nba  V: 6  S: 10  Program 4 - MAP 13 - ADRO + AutoS  enabled WNR/NR  Adaptive Nba  V: 6  S: 10    FUNCTIONAL TESTING  Testing prior to programming at user settings (P1/V6/S12)    All testing was completed in the soundfield at 0 degrees azimuth. Pure tone testing was obtained using pulsed frequency modulating (FM) stimuli. Sentence and word recognition testing was performed with recorded material at 60 dBSPL.    LISTENING CONDITION Aided thresholds 250-6000 Hz CNC Words  AzBio Sentences in Quiet  AzBio Sentences in +10 SNR 4-talker babble (SIENNIE)    Left ear unaided only; right plugged 50-75 dB HL 0% DNT DNT   Left ear unaided only; right unplugged DNT DNT DNT 32%   Left CI only; right plugged 25-65 dB HL 32% 46% 61%     [place] Handicap Inventory (THI) was completed and patient scored 48 /100 which is consistent with a moderate handicap. Speech Spatial Qualities (SSQ) Speech Spatial Qualities (SSQ) is a self-assessment which inquires about aspects of a persons ability and experience hearing and listening and different situations. The person scores their ability on each measure as 0 - no ability and 10 - perfectly. The SSQ was completed today and patient scored a 58/140 for speech hearing, 24/170 for spatial hearing, and 58/180 for quality of hearing.    IMPRESSIONS  Positive stimulation on all active electrodes.  A reliable psychophysical MAP was defined in the ACE speech  processing strategy. Testing revealed significant improvement in recognition since last evaluation (July, 2024) and stable detection. Improvement in hearing in noise with the right ear+CI (61%) vs hearing in noise with the right ear only (32%). Detection is appropriate for CI recipients. Moderate tinnitus handicap per THI today however patient reports improvement in tinnitus when restricting salt and caffeine. SSQ completed today.     RECOMMENDATIONS  1. Continue medical follow-up with your neuro-otologist (Dr. Thomas, Dr. Leslie, or Dr. Garg)  2. Utilize the Cochlear [employer] Nucleus Cochlear [id number] (KANSO 2) sound processor daily using the most tolerated program.  3. Return annually for programming, optimization, and functional testing.   4. Utilize aural rehabilitation/auditory training programs, books on tape/audiobooks, podcasts, and written materials at home to improve speech understanding ability.  5. Communication strategies were discussed (utilizing visual cues/gestures; reducing background noise and distance from desired source; increasing light to assist with visual cues; use of clear speech).  6. Contact [facility] CI Team auditory-verbal therapist to initiate aural rehabilitation therapy.    WILLIAM Lopes, CCC-A  Senior Clinical Audiologist    TIME: [phone]

## 2024-11-18 ENCOUNTER — APPOINTMENT (OUTPATIENT)
Dept: SPEECH THERAPY | Facility: CLINIC | Age: 59
End: 2024-11-18
Payer: COMMERCIAL

## 2025-04-23 ENCOUNTER — APPOINTMENT (OUTPATIENT)
Dept: OTOLARYNGOLOGY | Facility: CLINIC | Age: 60
End: 2025-04-23
Payer: COMMERCIAL

## 2025-04-23 VITALS — BODY MASS INDEX: 31.85 KG/M2 | HEIGHT: 59 IN | WEIGHT: 158 LBS

## 2025-04-23 DIAGNOSIS — H90.3 ASNHL (ASYMMETRICAL SENSORINEURAL HEARING LOSS): ICD-10-CM

## 2025-04-23 DIAGNOSIS — H90.3 BILATERAL SENSORINEURAL HEARING LOSS: Primary | ICD-10-CM

## 2025-04-23 DIAGNOSIS — H93.12 TINNITUS OF LEFT EAR: ICD-10-CM

## 2025-04-23 DIAGNOSIS — H81.02 MENIERE'S DISEASE OF LEFT EAR: ICD-10-CM

## 2025-04-23 PROCEDURE — 3008F BODY MASS INDEX DOCD: CPT | Performed by: OTOLARYNGOLOGY

## 2025-04-23 PROCEDURE — 99213 OFFICE O/P EST LOW 20 MIN: CPT | Performed by: OTOLARYNGOLOGY

## 2025-04-23 NOTE — LETTER
2025     Vinicio Rich MD  [address]  [facility] [address]    Patient: Sue Holland   YOB: 1965   Date of Visit: 2025       Dear Dr. Vinicio Rich MD:    Thank you for referring Sue Holland to me for evaluation. Below are my notes for this consultation.  If you have questions, please do not hesitate to call me. I look forward to following your patient along with you.       Sincerely,     Matthew Gonzalez MD      CC: WILLIAM Lopes, CCC-A  Sussy Basurto, GLENYS Stockton, DO  ______________________________________________________________________________________            Reason for Consult:  Follow-up     Subjective  History Of Present Illness:  Sue Holland is a 60 y.o. female with sudden drop in hearing on the left with dizzy spells. Her hearing did not recover after steroids and Dr. Wetzel placed a cochlear implant  on 2024. She has noted improvement in hearing and speech understanding.     She has dizziness that lasts from minutes to hours. She notes this worsens with salt intake. This is consistent with left-sided Meniere's disease. I put her on a low salt diet and caffeine restrictions in the last visit and since then she has been free of dizziness. We hold of on prescribing a diuretic as her dizziness is under control with conservative measures.    Since her visit with, her speech understanding is improving. Previous  intermittent disequilibrium resolved. No complains.     Past Medical History:  She has a past medical history of Anemia (), COVID-19 (), Fibroid (), HL (hearing loss) (), Mixed conductive and sensorineural hearing loss (), PONV (postoperative nausea and vomiting), Pregnant ([facility]) (5/10/85), Tinnitus (), and Vertigo (2018).    Surgical History:  She has a past surgical history that includes Hysterectomy;  section, low transverse; Cochlear implant  "(04/2023); and Colonoscopy (2016).     Social History:  She reports that she has never smoked. She has never been exposed to tobacco smoke. She has never used smokeless tobacco. She reports that she does not drink alcohol and does not use drugs.    Family History:  family history includes Cancer in her father and mother; Heart disease in her mother; Hypertension in her mother.     Medications:  Current Outpatient Medications   Medication Instructions   • estradiol (ESTRACE) 0.5 mg, Daily   • ondansetron ODT (ZOFRAN-ODT) 4 mg, oral, Every 8 hours PRN   • sennosides-docusate sodium (Jenifer-Colace) 8.6-50 mg tablet 1 tablet, oral, Daily PRN      Allergies:  Patient has no known allergies.    Review of Systems:   A comprehensive 10-point review of systems was obtained including constitutional, neurological, HEENT, pulmonary, cardiovascular, genito-urinary, and other pertinent systems and was negative except as noted in the HPI.     Objective  Physical Exam:  Last Recorded Vitals: Height 1.499 m (4' 11\"), weight 71.7 kg (158 lb).    On physical exam, the patient is a well-nourished, well-developed patient, in no acute distress, able to communicate without assistance in English language. Head and face is atraumatic and normocephalic. Salivary glands are intact. Facial strength is symmetrical bilaterally.       On ear examination:  Right ear: The patient has an open and patent ear canal. The tympanic membrane is intact.  AC>BC  Left ear: The patient has an open and patent ear canal. The tympanic membrane is intact. The magnet is well placed.   Cannot discern sound  The Roque is right    On vestibular exam, the patient has no spontaneous nystagmus, no headshake nystagmus, no head-thrust nystagmus, and no nystagmus on hyperventilation or Valsalva maneuvers. Nehemias-Hallpike maneuver is negative bilaterally.       On neuro exam, the patient is alert and oriented x3, cranial nerves are grossly intact, cerebellar exam is normal.  "     The rest of the exam, including anterior rhinoscopy, oropharyngeal exam, neck exam, and cardiovascular exam, were normal including no palpable lymphadenopathies, thyroid in the midline position, normal pulses, and normal chest excursion.       Reviewed Results:  Audiology Testing:  I reviewed her last programming session from 11/2024 with improvement of speech understanding.    LISTENING CONDITION Aided thresholds 250-6000 Hz CNC Words  AzBio Sentences in Quiet  AzBio Sentences in +10 SNR 4-talker babble (SIENNIE)    Left ear unaided only; right plugged 50-75 dB HL 0% DNT DNT   Left ear unaided only; right unplugged DNT DNT DNT 32%   Left CI only; right plugged 25-65 dB HL 32% 46% 61%          I reviewed her last programming session from 07/2024:  Very good levels. Minimal improvement of speech understanding with 0% on CNC and 5% on AzBio.       I personally reviewed the audiogram from 09/2023 that showed:   Right ear: normal hearing . Discrimination: 96%   Left ear: profound SNHL . Discrimination: 0%      Cochlear Implant Evaluation:  I reviewed the preop CI evaluation from 09/2023 that showed:  Right: Hearing in Quiet: - CNC: 100%; - Azbio: 100%      Hearing in Noise: - Azbio @ +10SNR: 97%; - Azbio @ +5SNR : DNT      Left: Hearing in Quiet:  - CNC: 0%; - Azbio: 0%      Hearing in Noise:   - Azbio @ +10SNR: 0%; - Azbio @ +5SNR : DNT                   Imaging:  I reviewed her MRI from 09/2023 that shows no evidence of retrocochlear     Procedure:  None    Assessment/Plan    1. Bilateral sensorineural hearing loss    2. ASNHL (asymmetrical sensorineural hearing loss)    3. Tinnitus of left ear    4. Meniere's disease of left ear        In summary, Sue Holland is a 60 y.o. female with sudden drop in hearing on the left with dizzy spells. Her hearing did not recover after steroids and Dr. Wetzel placed a cochlear implant  on 03/2024. She has noted improvement in hearing and speech understanding.     She  has dizziness that lasts from minutes to hours. She notes this worsens with salt intake. This is consistent with left-sided Meniere's disease. I put her on a low salt diet and caffeine restrictions in the last visit and since then she has been free of dizziness. We hold of on prescribing a diuretic as her dizziness is under control with conservative measures.    Since her visit with, her speech understanding is improving. Previous  intermittent disequilibrium resolved. No complains.    Fu in 12mo.       ____________________________________________________  Matthew Leslie MD  Professor and Chief   Otology/Neurotology/Lateral Skull-Base Surgery   [facility]

## 2025-05-08 ENCOUNTER — APPOINTMENT (OUTPATIENT)
Dept: AUDIOLOGY | Facility: CLINIC | Age: 60
End: 2025-05-08
Payer: COMMERCIAL

## 2025-05-08 DIAGNOSIS — H90.3 ASNHL (ASYMMETRICAL SENSORINEURAL HEARING LOSS): Primary | ICD-10-CM

## 2025-05-08 DIAGNOSIS — Z96.21 COCHLEAR IMPLANT IN PLACE: ICD-10-CM

## 2025-05-08 NOTE — PROGRESS NOTES
COCHLEAR IMPLANT PROGRAMMING and FUNCTIONAL TESTING       Name:  Sue Holland  :  1965  Age:  60 y.o.  Date of Evaluation:  2025     RIGHT DEVICE  External Device: None    LEFT DEVICE  External Device: Cochlear [id number] (KANSO 2) sound processor  Internal Device: Cochlear [employer]   [id number]   Surgeon: Matthew Leslie MD  Implantation Date: 2024  Activation Date: 2024    HISTORY  Sue Holland arrives today for programming and optimization of the left cochlear implant used in conjunction with a Cochlear [id number] (KANSO 2) sound processor. The patient reports daily use of the sound processor during all waking moments. She reports tinnitus and balance have improve since restricting salt and caffeine intake in her diet. Has not continued daily listening practice streaming directly to her CI only recently.     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    PROGRAMMING  Headset pressure, magnet strength (4I), and incision site were checked with no problems noted. Datalogging revealed 10+ hours of use per day with 2+ hours in speech, on average since the last appointment.    Electrode impedances, used to monitor internal device function, were measured across the electrode array.  Impedance measures were within normal limits for all electrodes, in each stimulation mode with exception of ch. 18 which is open circuit. Programming began from MAP 14.  Current parameters of MP1+2 stimulation mode, an ACE speech processing strategy, 8 maxima, -6983 Hz and a 900 Hz stimulation rate were maintained.  Threshold (T) levels were measuring using up-down bracketing method on a variety of channels with the remaining channels interpolated. Comfort (C) levels were measuring using psychophysical loudness scaling method on a variety of channels with the remaining channels  interpolated.  C levels were swept for loudness balancing (MAP 15). Changed FAT to 538-7938 Hz and she prefers this setting today reporting speech is louder and clear (MAP 16). No facial stimulation or non-auditory stimulation observed during live speech. Current programming consists of:    PROGRAM MAP SMARTSOUND WNR+SNR MICROPHONE VOLUME SENSITIVITY COMMENTS   1 MAP 16 SCAN Enabled Standard  Fixed  Adaptive 6 10 -7938   2 MAP 16 ADRO+AutoS Enabled Adaptive 6 8 -7938   3 MAP 15 SCAN Enabled Standard  Fixed  Adaptive 6 10 -7938   4 MAP 15 ADRO+AutoS Enabled Adaptive 6 8 -7938     FUNCTIONAL TESTING  Testing prior to programming at user settings (P1/V3/S12)    All testing was completed in the soundfield at 0 degrees azimuth. Pure tone testing was obtained using pulsed frequency modulating (FM) stimuli. Sentence and word recognition testing was performed with recorded material at 60 dBSPL.    LISTENING CONDITION Aided thresholds 250-6000 Hz CNC Words  AzBio Sentences in Quiet  AzBio Sentences in +10 SNR 4-talker babble (IRENE)    Left ear unaided only; right plugged 50-70 dB HL 0% DNT DNT   Left ear unaided only; right unplugged DNT DNT DNT 12%   Left CI only; right plugged 25-40 dB HL 12% DNT 25%     [place] Handicap Inventory (THI) was completed and patient scored 38 /100 which is consistent with a moderate handicap. Speech Spatial Qualities (SSQ) Speech Spatial Qualities (SSQ) is a self-assessment which inquires about aspects of a persons ability and experience hearing and listening and different situations. The person scores their ability on each measure as 0 - no ability and 10 - perfectly. The SSQ was completed today and patient scored a 51/140 for speech hearing, 44/170 for spatial hearing, and 82/180 for quality of hearing.    UNAIDED TESTING (puretone audiometry only 125-8000 Hz)  RIGHT: Normal to mild sensorineural hearing loss. Tympanometry revealed normal middle ear  pressure, mobility, and ear canal volume.    LEFT: Severe to profound sensorineural hearing loss. Tympanometry revealed normal middle ear pressure, mobility, and ear canal volume.     IMPRESSIONS  Positive stimulation on all active electrodes.  A reliable psychophysical MAP was defined in the ACE speech processing strategy. Improvement in hearing in noise with the right ear+CI (25%) vs hearing in noise with the right ear only (12%). Detection is appropriate for CI recipients. Moderate tinnitus handicap per THI today however patient reports improvement in tinnitus when restricting salt and caffeine. SSQ completed today.     Encouraged her to continue to weekly listening practice streaming to her left CI only.    RECOMMENDATIONS  1. Continue medical follow-up with your neuro-otologist (Dr. Thomas, Dr. Leslie, or Dr. Garg)  2. Utilize the Cochlear [employer] Cochlear [id number] (KANSO 2) sound processor daily using the most tolerated program.  3. Return annually for programming, optimization, and functional testing.   4. Utilize aural rehabilitation/auditory training programs, books on tape/audiobooks, podcasts, and written materials at home to improve speech understanding ability.  5. Communication strategies were discussed (utilizing visual cues/gestures; reducing background noise and distance from desired source; increasing light to assist with visual cues; use of clear speech).  6. Contact [facility] CI Team auditory-verbal therapist to initiate aural rehabilitation therapy.    WILLIAM Lopes, CCC-A  Senior Clinical Audiologist    TIME: [phone]

## 2026-04-22 ENCOUNTER — APPOINTMENT (OUTPATIENT)
Dept: OTOLARYNGOLOGY | Facility: CLINIC | Age: 61
End: 2026-04-22
Payer: COMMERCIAL

## 2026-05-15 ENCOUNTER — APPOINTMENT (OUTPATIENT)
Dept: AUDIOLOGY | Facility: CLINIC | Age: 61
End: 2026-05-15
Payer: COMMERCIAL

## (undated) DEVICE — ELECTRODE, PAIRED SUBDERMAL OTO

## (undated) DEVICE — CLEANER, WIPE, INSTRUMENT, 3.25 X 3.25 IN

## (undated) DEVICE — BAND, RUBBER, 3 IN, STERILE

## (undated) DEVICE — SUTURE, MONOCRYL, 4-0, 18 IN, PS2, UNDYED

## (undated) DEVICE — DRAPE, INSTRUMENT, W/POUCH, STERI DRAPE, 9 5/8 X 18 LONG

## (undated) DEVICE — DRESSING, EAR, GLASSCOCK, ADULT

## (undated) DEVICE — SYRINGE, MONOJECT, LUER LOCK, 3 CC, LF

## (undated) DEVICE — SYRINGE, 1 CC, LUER LOCK

## (undated) DEVICE — BUR, ELITE, TAPERED DIAMOND, 1.0 MM

## (undated) DEVICE — SUTURE, VICRYL, 3-0,18 IN, SH, UNDYED

## (undated) DEVICE — TOWEL, SURGICAL, NEURO, O/R, 16 X 26, BLUE, STERILE

## (undated) DEVICE — DRAPE, SMARTDRAPE, FOR TIVATO MICROSCOPE

## (undated) DEVICE — BUR, CUTTER 5MM CARBIDE ROUND

## (undated) DEVICE — NEEDLE, HYPODERMIC, MONOJECT, TRI-BEVELED, ANTI-CORING, 25 G X 1.25 IN, LUER LOCK HUB, RED

## (undated) DEVICE — STOCKINETTE, IMPERVIOUS, 12 X 48 IN, LF, STERILE

## (undated) DEVICE — TAPE, SILK, DURAPORE, 3 IN X 10 YD, LF

## (undated) DEVICE — BUR, ELITE, ROUND DIAMOND, 2.0 MM, COARSE

## (undated) DEVICE — Device

## (undated) DEVICE — PROTECTOR, NERVE, ULNAR, PINK

## (undated) DEVICE — BUR, ELITE, ROUND DIAMOND, 3.0 MM, COARSE

## (undated) DEVICE — TIP, SUCTION, FRAZIER, 8 FR

## (undated) DEVICE — DRESSING, MOISTURE VAPOR PERMEABLE, POLYSKIN II, 2 X 2.75 IN, TRANSPARENT

## (undated) DEVICE — COMB, HAIR, 7 IN, PLASTIC, BLACK

## (undated) DEVICE — TUBING, SUCTION, OTOMED

## (undated) DEVICE — WAX, BONE, 2.5 GM

## (undated) DEVICE — STRIP, SKIN CLOSURE, STERI STRIP, REINFORCED, 0.5 X 4 IN

## (undated) DEVICE — NEEDLE, HYPODERMIC, MONOJECT, TRI-BEVELED, ANTI-CORING, 27 G X 1.25 IN, LUER LOCK HUB, YELLOW

## (undated) DEVICE — DRAPE, SURGICAL, OTOLOGY GLASSCOCK

## (undated) DEVICE — CATHETER, IV, ANGIOCATH, 20 G X 1.88 IN, FEP POLYMER

## (undated) DEVICE — SUTURE, SILK, 2-0, 30 IN, SH, BLACK

## (undated) DEVICE — GLOVE, SURGICAL, PROTEXIS PI , 7.0, PF, LF